# Patient Record
Sex: MALE | Race: WHITE
[De-identification: names, ages, dates, MRNs, and addresses within clinical notes are randomized per-mention and may not be internally consistent; named-entity substitution may affect disease eponyms.]

---

## 2017-05-22 ENCOUNTER — HOSPITAL ENCOUNTER (OUTPATIENT)
Dept: HOSPITAL 47 - OR | Age: 57
Discharge: HOME | End: 2017-05-22
Attending: SURGERY
Payer: COMMERCIAL

## 2017-05-22 VITALS — RESPIRATION RATE: 16 BRPM

## 2017-05-22 VITALS — SYSTOLIC BLOOD PRESSURE: 145 MMHG | HEART RATE: 47 BPM | DIASTOLIC BLOOD PRESSURE: 87 MMHG

## 2017-05-22 VITALS — TEMPERATURE: 96.8 F

## 2017-05-22 VITALS — BODY MASS INDEX: 26.3 KG/M2

## 2017-05-22 DIAGNOSIS — Z87.891: ICD-10-CM

## 2017-05-22 DIAGNOSIS — K40.90: Primary | ICD-10-CM

## 2017-05-22 PROCEDURE — 49520 REREPAIR ING HERNIA REDUCE: CPT

## 2017-05-22 RX ADMIN — HYDROMORPHONE HYDROCHLORIDE PRN MG: 1 INJECTION, SOLUTION INTRAMUSCULAR; INTRAVENOUS; SUBCUTANEOUS at 09:18

## 2017-05-22 RX ADMIN — HYDROMORPHONE HYDROCHLORIDE PRN MG: 1 INJECTION, SOLUTION INTRAMUSCULAR; INTRAVENOUS; SUBCUTANEOUS at 09:13

## 2017-05-22 NOTE — P.GSHP
History of Present Illness


H&P Date: 05/22/17


Chief Complaint: Recurrent right inguinal hernia





This is a 57-year-old male who has developed a recurrent right internal hernia.

  Patient resents today for open repair of right inguinal hernia.





Past Medical History


Past Medical History: Hyperlipidemia, Hypertension


Additional Past Medical History / Comment(s): PAST HX HYPOGLYCEMIA, NONE SINCE 

2001.  GOUT.  CURRENT RT INGUINAL HERNIA.


History of Any Multi-Drug Resistant Organisms: None Reported


Past Surgical History: Appendectomy, Joint Replacement


Additional Past Surgical History / Comment(s): COLONOSCOPY.  TOTAL LT KNEE , 

ROBOTIC HERNIA


Past Anesthesia/Blood Transfusion Reactions: No Reported Reaction


Additional Past Anesthesia/Blood Transfusion Reaction / Comment(s): ADOPTED -NO 

HISTORY


Past Psychological History: No Psychological Hx Reported


Smoking Status: Former smoker


Past Alcohol Use History: Occasional


Additional Past Alcohol Use History / Comment(s): STARTED SMOKING AT AGE 15 

QUIT IN 2000 SMOKED 3/4-1PPD


Past Drug Use History: None Reported





- Past Family History


  ** Mother


Family Medical History: Unable to Obtain





Medications and Allergies


 Home Medications











 Medication  Instructions  Recorded  Confirmed  Type


 


No Known Home Medications [No  05/17/17 05/22/17 History





Known Home Medications]    











 Allergies











Allergy/AdvReac Type Severity Reaction Status Date / Time


 


No Known Allergies Allergy   Verified 05/22/17 06:12














Surgical - Exam


 Vital Signs











Temp Pulse Resp BP Pulse Ox


 


 97.0 F L  51 L  16   142/89   97 


 


 05/22/17 06:17  05/22/17 06:17  05/22/17 06:17  05/22/17 06:17  05/22/17 06:17














- General


well developed, well nourished, no distress





- Eyes


PERRL





- ENT


normal pinna, normal nares





- Neck


no masses





- Respiratory


normal expansion





- Cardiovascular


Rhythm: regular





- Abdomen


Abdomen: soft, non tender


Hernia: inguinal (Right recurrent inguinal hernia)





Assessment and Plan


Plan: 





Recurrent right inguinal hernia.  We'll perform open repair.

## 2017-05-22 NOTE — P.OP
Date of Procedure: 05/22/17


Preoperative Diagnosis: 


Recurrent right inguinal hernia


Postoperative Diagnosis: 


Recurrent right inguinal hernia


Procedure(s) Performed: 


Right inguinal hernia repair


Implants: 





Anesthesia: JENNIFFERA


Surgeon: Regulo Duarte


Estimated Blood Loss (ml): 5


Pathology: none sent


Condition: stable


Disposition: PACU


Indications for Procedure: 





Operative Findings: 





Description of Procedure: 


DESCRIPTION OF PROCEDURE:   The patient was placed in the supine position after 

receiving adequate anesthesia.    Patients groin was prepped and draped in the 

usual sterile fashion.    A standard hernia incision was made and the 

subcutaneous tissues were divided with electrocautery.   The fascia of the 

external oblique was exposed.  A nick the fascia was made with #15 blade.   The 

fascia was then opened with pair of Metzenbaum scissors.    A Weitlaner 

retractor was placed in the wound and the cord structures were grasped and 

dissected free from the inguinal canal.   A rubber Penrose drain was placed 

around the cord structures.   The hernial sac was seen on the anterior-medial 

portion of the cord and this was dissected free from the cord.    The hernia 

sac was then invaginated to the peritoneal cavity.   Using blunt finger 

dissection, the preperitoneal space was dissected and then the Prolene hernial 

mesh plug was placed into the prepared space.   The inferior leaf was expanded.

  The superior leaf was secured to the pubic tubercle using 2-0 Prolene suture.

  The lateral portion of the superior leaf was incised and cords tied and 

secured to the transversalis fascia using 2-0 Prolene suture.   Fascia of the 

external oblique was then closed using #0 Vicryl suture.  The Penrose drain was 

removed.   The Scarpas fascia was then closed with 3-0 Vicryl suture and skin 

was closed with 3-0 Monocryl.  Patient tolerated procedure well.

## 2021-04-14 ENCOUNTER — HOSPITAL ENCOUNTER (INPATIENT)
Dept: HOSPITAL 47 - EC | Age: 61
LOS: 3 days | Discharge: HOME | DRG: 39 | End: 2021-04-17
Attending: INTERNAL MEDICINE | Admitting: INTERNAL MEDICINE
Payer: COMMERCIAL

## 2021-04-14 DIAGNOSIS — M10.9: ICD-10-CM

## 2021-04-14 DIAGNOSIS — Z96.652: ICD-10-CM

## 2021-04-14 DIAGNOSIS — Z87.891: ICD-10-CM

## 2021-04-14 DIAGNOSIS — E78.5: ICD-10-CM

## 2021-04-14 DIAGNOSIS — Z83.3: ICD-10-CM

## 2021-04-14 DIAGNOSIS — I65.22: Primary | ICD-10-CM

## 2021-04-14 DIAGNOSIS — Z20.822: ICD-10-CM

## 2021-04-14 DIAGNOSIS — Z91.14: ICD-10-CM

## 2021-04-14 DIAGNOSIS — H54.62: ICD-10-CM

## 2021-04-14 DIAGNOSIS — R00.1: ICD-10-CM

## 2021-04-14 DIAGNOSIS — Z90.49: ICD-10-CM

## 2021-04-14 DIAGNOSIS — Z87.19: ICD-10-CM

## 2021-04-14 DIAGNOSIS — E78.00: ICD-10-CM

## 2021-04-14 DIAGNOSIS — I10: ICD-10-CM

## 2021-04-14 LAB
ALBUMIN SERPL-MCNC: 4.2 G/DL (ref 3.5–5)
ALP SERPL-CCNC: 37 U/L (ref 38–126)
ALT SERPL-CCNC: 20 U/L (ref 4–49)
ANION GAP SERPL CALC-SCNC: 6 MMOL/L
APTT BLD: 25.1 SEC (ref 22–30)
AST SERPL-CCNC: 25 U/L (ref 17–59)
BASOPHILS # BLD AUTO: 0 K/UL (ref 0–0.2)
BASOPHILS NFR BLD AUTO: 0 %
BUN SERPL-SCNC: 18 MG/DL (ref 9–20)
CALCIUM SPEC-MCNC: 9.6 MG/DL (ref 8.4–10.2)
CHLORIDE SERPL-SCNC: 107 MMOL/L (ref 98–107)
CO2 SERPL-SCNC: 27 MMOL/L (ref 22–30)
EOSINOPHIL # BLD AUTO: 0.3 K/UL (ref 0–0.7)
EOSINOPHIL NFR BLD AUTO: 5 %
ERYTHROCYTE [DISTWIDTH] IN BLOOD BY AUTOMATED COUNT: 4.91 M/UL (ref 4.3–5.9)
ERYTHROCYTE [DISTWIDTH] IN BLOOD: 13.2 % (ref 11.5–15.5)
GLUCOSE SERPL-MCNC: 95 MG/DL (ref 74–99)
HCT VFR BLD AUTO: 44.1 % (ref 39–53)
HGB BLD-MCNC: 15.2 GM/DL (ref 13–17.5)
INR PPP: 1 (ref ?–1.2)
LYMPHOCYTES # SPEC AUTO: 1.4 K/UL (ref 1–4.8)
LYMPHOCYTES NFR SPEC AUTO: 24 %
MCH RBC QN AUTO: 31.1 PG (ref 25–35)
MCHC RBC AUTO-ENTMCNC: 34.5 G/DL (ref 31–37)
MCV RBC AUTO: 89.9 FL (ref 80–100)
MONOCYTES # BLD AUTO: 0.3 K/UL (ref 0–1)
MONOCYTES NFR BLD AUTO: 5 %
NEUTROPHILS # BLD AUTO: 3.9 K/UL (ref 1.3–7.7)
NEUTROPHILS NFR BLD AUTO: 64 %
PLATELET # BLD AUTO: 206 K/UL (ref 150–450)
POTASSIUM SERPL-SCNC: 4.5 MMOL/L (ref 3.5–5.1)
PROT SERPL-MCNC: 7.1 G/DL (ref 6.3–8.2)
PT BLD: 10.4 SEC (ref 9–12)
SODIUM SERPL-SCNC: 140 MMOL/L (ref 137–145)
WBC # BLD AUTO: 6 K/UL (ref 3.8–10.6)

## 2021-04-14 PROCEDURE — 93306 TTE W/DOPPLER COMPLETE: CPT

## 2021-04-14 PROCEDURE — 80061 LIPID PANEL: CPT

## 2021-04-14 PROCEDURE — 99285 EMERGENCY DEPT VISIT HI MDM: CPT

## 2021-04-14 PROCEDURE — 85610 PROTHROMBIN TIME: CPT

## 2021-04-14 PROCEDURE — 86901 BLOOD TYPING SEROLOGIC RH(D): CPT

## 2021-04-14 PROCEDURE — 70450 CT HEAD/BRAIN W/O DYE: CPT

## 2021-04-14 PROCEDURE — 88304 TISSUE EXAM BY PATHOLOGIST: CPT

## 2021-04-14 PROCEDURE — 86850 RBC ANTIBODY SCREEN: CPT

## 2021-04-14 PROCEDURE — 93880 EXTRACRANIAL BILAT STUDY: CPT

## 2021-04-14 PROCEDURE — 88311 DECALCIFY TISSUE: CPT

## 2021-04-14 PROCEDURE — 36415 COLL VENOUS BLD VENIPUNCTURE: CPT

## 2021-04-14 PROCEDURE — 88305 TISSUE EXAM BY PATHOLOGIST: CPT

## 2021-04-14 PROCEDURE — 93005 ELECTROCARDIOGRAM TRACING: CPT

## 2021-04-14 PROCEDURE — 85025 COMPLETE CBC W/AUTO DIFF WBC: CPT

## 2021-04-14 PROCEDURE — 85652 RBC SED RATE AUTOMATED: CPT

## 2021-04-14 PROCEDURE — 84484 ASSAY OF TROPONIN QUANT: CPT

## 2021-04-14 PROCEDURE — 83036 HEMOGLOBIN GLYCOSYLATED A1C: CPT

## 2021-04-14 PROCEDURE — 70496 CT ANGIOGRAPHY HEAD: CPT

## 2021-04-14 PROCEDURE — 71046 X-RAY EXAM CHEST 2 VIEWS: CPT

## 2021-04-14 PROCEDURE — 80053 COMPREHEN METABOLIC PANEL: CPT

## 2021-04-14 PROCEDURE — 70498 CT ANGIOGRAPHY NECK: CPT

## 2021-04-14 PROCEDURE — 86140 C-REACTIVE PROTEIN: CPT

## 2021-04-14 PROCEDURE — 82607 VITAMIN B-12: CPT

## 2021-04-14 PROCEDURE — 85730 THROMBOPLASTIN TIME PARTIAL: CPT

## 2021-04-14 PROCEDURE — 80048 BASIC METABOLIC PNL TOTAL CA: CPT

## 2021-04-14 PROCEDURE — 70551 MRI BRAIN STEM W/O DYE: CPT

## 2021-04-14 PROCEDURE — 86900 BLOOD TYPING SEROLOGIC ABO: CPT

## 2021-04-14 PROCEDURE — 87635 SARS-COV-2 COVID-19 AMP PRB: CPT

## 2021-04-14 PROCEDURE — 84443 ASSAY THYROID STIM HORMONE: CPT

## 2021-04-14 RX ADMIN — CEFAZOLIN SCH MLS/HR: 330 INJECTION, POWDER, FOR SOLUTION INTRAMUSCULAR; INTRAVENOUS at 23:30

## 2021-04-14 RX ADMIN — CEFAZOLIN SCH MLS/HR: 330 INJECTION, POWDER, FOR SOLUTION INTRAMUSCULAR; INTRAVENOUS at 13:34

## 2021-04-14 NOTE — CT
EXAMINATION TYPE: CT angio head neck

 

DATE OF EXAM: 4/14/2021

 

HISTORY: Left vision loss

 

COMPARISON: Carotid ultrasound earlier today.

 

CT DLP: 600.1 mGycm.  Automated Exposure Control for Dose Reduction was Utilized.

 

TECHNIQUE:  CTA scan of the head and neck are performed with IV Contrast, patient injected with 65 mL
 of Isovue 370, axial images are obtained, coronal and sagittal reformatted images are reviewed. Thre
e-D reconstructed images are created on an independent workstation and reviewed.

 

FINDINGS:

 

Carotid/Vascular Structures: Mild peripheral plaque at level aortic arch. Normal three-vessel origin 
from aortic arch. Normal origin right common carotid artery from right brachiocephalic artery. No sig
nificant plaque or stenosis in common carotid arteries bilaterally. There is mild mixed plaque right 
carotid bulb with more prominent noncalcified plaque extending into the external carotid artery. Ther
e is additional abnormal soft tissue probable noncalcified plaque surrounding the proximal right inte
rnal carotid artery without significant stenosis. Some narrowing is present measuring under 50%. Mild
 to moderate calcified plaque distal supraclinoid segment.

 

More moderate to severe mixed plaque left carotid bulb extends into internal and external carotid art
eries. There is significant stenosis on the left confirmed. There is short segment of nonvisualized f
low consistent with complete occlusion raw data image 405 for reference. Remainder left internal cunningham
tid artery shows diminished flow versus opposite right side. Patent bilateral external carotid arteri
es without greater than 50% stenosis.

 

Codominant vertebrobasilar system. Vertebral arteries are patent to basilar junction. Hypoplastic evelyne
ateral posterior communicating arteries. No additional significant focal stenosis. Anterior circulati
on shows poor visualization of anterior communicating artery. No significant focal stenosis or aneury
sm.

 

Other: Loss of normal cervical curvature with mild disc space narrowing C5-C6 and C6-C7 levels. S-sha
ped scoliotic curvature on coronal images.

 

IMPRESSION: Confirmation of significant stenosis proximal left internal carotid artery, complete occl
usion over short segment is felt present. There is poor or diminished flow in the left internal carot
id artery distal to this versus the opposite right side.

## 2021-04-14 NOTE — P.HPIM
History of Present Illness


H&P Date: 04/14/21


Chief Complaint: Left eye blurred vision





Patient is a 61-year-old male with a known history of hypertension, 

hyperlipidemia currently not on any medications and occasional marijuana use 

presents to ER with complaints of blurry vision in the left eye.  Patient felt 

like everything was gray when he closes his right eye.  Patient was able to see 

well by closing left eye.  Patient had this episode occurred about 2 days ago 

and lasted about 3 to 5 minutes.  Since then patient has been having some blurry

vision.  Denied any fever or chills.  No cough or sputum production.  No recent 

infection.  No chest pain or shortness breath.  No palpitations.  No leg 

swelling.  No focal weakness or slurred speech.  No complaints of double vision.

 No jaw pain or temporal pain jaw claudication.


Patient states that he had [employer] COVID-19 vaccine about a month ago and started 

having occasional headaches few days after.


CT head showed no acute intracranial process


Chest x-ray showed no acute cardiopulmonary process


EKG showed marked sinus bradycardia.  Abnormal EKG.  Heart rate for 42


Laboratory data showed WBC 6.0 hemoglobin 15.2 and platelets 206


BUN 18 and creatinine 0.85 AST 25 ALT 20 alk phos 37 troponin less than 0.012 

and coronavirus PCR not detected.





Review of Systems





Constitutional: Patient denies any fever or chills .  No generalized weakness or

weight loss.  


Abdomen: Patient denied nausea vomiting and diarrhea and abdominal pain.


Cardiovascular: Patient denies any chest pain or short of breath no 

palpitations.


Respiratory: patient denied any cough or sputum production.  No shortness of 

breath


Neurologic: Patient denied any numbness or tingling headache. Left eye blurred 

vision


Musculoskeletal: Patient denies any complaints of joint swelling or deformity.


Skin: Negative


Psychiatric: Negative


Endocrine: No heat or cold intolerance.  No recent weight gain.


Genitourinary: No dysuria or hematuria.


All other 14 point ROS negative except the above





Past Medical History


Past Medical History: Hyperlipidemia, Hypertension


Additional Past Medical History / Comment(s): PAST HX HYPOGLYCEMIA, NONE SINCE 

2001.  GOUT.  CURRENT RT INGUINAL HERNIA.


History of Any Multi-Drug Resistant Organisms: None Reported


Past Surgical History: Appendectomy, Joint Replacement


Additional Past Surgical History / Comment(s): COLONOSCOPY.  TOTAL LT KNEE , 

ROBOTIC HERNIA


Past Anesthesia/Blood Transfusion Reactions: No Reported Reaction


Additional Past Anesthesia/Blood Transfusion Reaction / Comment(s): ADOPTED -NO 

HISTORY


Past Psychological History: No Psychological Hx Reported


Smoking Status: Never smoker


Past Alcohol Use History: Occasional


Past Drug Use History: Marijuana





- Past Family History


  ** Mother


Family Medical History: Unable to Obtain


Additional Family Medical History / Comment(s): Pt is adopted and only knows 

heart disease ran on natural mother's side of family





  ** Father


Family Medical History: Diabetes Mellitus


Additional Family Medical History / Comment(s): Pt is adopted and only knows 

diabetes ran on natural father's side of family.





Medications and Allergies


                                Home Medications











 Medication  Instructions  Recorded  Confirmed  Type


 


No Known Home Medications  04/14/21 04/14/21 History








                                    Allergies











Allergy/AdvReac Type Severity Reaction Status Date / Time


 


No Known Allergies Allergy   Verified 04/14/21 12:53














Physical Exam


Vitals: 


                                   Vital Signs











  Temp Pulse Resp BP Pulse Ox


 


 04/14/21 13:41   45 L  16  130/98  99


 


 04/14/21 12:32   46 L  16  128/80  99


 


 04/14/21 10:55  98.3 F  52 L  18  136/83  93 L








                                Intake and Output











 04/13/21 04/14/21 04/14/21





 22:59 06:59 14:59


 


Other:   


 


  Weight   92.986 kg














PHYSICAL EXAMINATION: 


Patient is lying in the bed comfortably, no acute distress, awake alert and 

oriented.. 


HEENT: Normocephalic. Neck is supple. Pupils reactive. Nostrils clear. Oral 

cavity is moist. Ears reveal no drainage. 


Neck reveals no JVD, carotid bruits, or thyromegaly. 


CHEST EXAMINATION: Trachea is central. Symmetrical expansion. Lung fields clear 

to auscultation and percussion. 


CARDIAC: Normal S1, S2 with no gallops. No murmurs 


ABDOMEN: Soft. Bowel sounds normal. No organomegaly. No abdominal bruits. 


Extremities: reveal no edema.  No clubbing or cyanosis


Neurologically awake, alert, oriented x3 with well-coordinated movements.  No 

focal deficits noted


Skin: No rash or skin lesions. 


Psychiatric: Coperative.  Nonsuicidal


Musculoskeletal: No joint swelling or deformity.  Normal range of motion.





Results


CBC & Chem 7: 


                                 04/14/21 11:36





                                 04/14/21 11:36


Labs: 


                  Abnormal Lab Results - Last 24 Hours (Table)











  04/14/21 Range/Units





  11:36 


 


Alkaline Phosphatase  37 L  ()  U/L














Thrombosis Risk Factor Assmnt





- DVT/VTE Prophylaxis


DVT/VTE Prophylaxis: Pharmacologic Prophylaxis ordered





- Choose All That Apply


Any of the Below Risk Factors Present?: Yes


Other Risk Factors: Yes


Each Risk Factor Represents 2 Points: Age 61-74 years


Other congenital or acquired thrombophilia - If yes, enter type in comment: No


Each Risk Factor Represents 5 Points: Stroke (< 1 month)


Thrombosis Risk Factor Assessment Total Risk Factor Score: 7


Thrombosis Risk Factor Assessment Level: High Risk





Assessment and Plan


Assessment: 





Transient left eye vision loss.  Rule out acute CVA


Sinus bradycardia


Hypertension not on any medications


Hyperlipidemia not on any medications at home


History of marijuana use


DVT prophylaxis





Plan:


Patient will be continued telemetry monitoring.  Started on aspirin and Plavix 

as per neurology recommendations.  Patient was given a dose of aspirin 325 mg 

once in the ER.  Loading dose of Plavix was given.


Stroke work-up including MRI of the brain and CT angiogram of the head and neck 

was ordered.  2D echocardiogram


Follow-up TSH A1c level and B12 and CRP levels.  Neurology is on board.  Further

recommendations based on the clinical course.


Time with Patient: Greater than 30

## 2021-04-14 NOTE — XR
EXAMINATION TYPE: XR chest 2V

 

DATE OF EXAM: 4/14/2021

 

COMPARISON: NONE

 

HISTORY:  Altered mental status, vision loss, weakness.  

 

TECHNIQUE:  Frontal and lateral views of the chest are obtained.

 

FINDINGS:  There is mild chronic parenchymal changes without suspicious focal air space opacity, pleu
ral effusion, or pneumothorax seen.  The cardiac silhouette size is within normal limits.   The osseo
us structures are intact. Overlying EKG leads are present.

 

IMPRESSION:  No acute cardiopulmonary process.

## 2021-04-14 NOTE — CT
EXAMINATION TYPE: CT brain wo con for TPA

 

DATE OF EXAM: 4/14/2021

 

HISTORY: loss of vision Lt eye 2 days ago. Acute onset neurological deficit. Code stroke.

 

CT DLP: 1059.4 mGycm.  Automated Exposure Control for Dose Reduction was Utilized.

 

TECHNIQUE: CT scan of the head is performed without contrast.

 

COMPARISON: None.

 

FINDINGS:   There is no acute intracranial hemorrhage or midline shift identified. Ventricles and sul
ci are within normal limits in size for patient's age. Gray-white matter differentiation fairly well 
maintained. The globes are intact and the visualized sinuses are clear.   

 

IMPRESSION:  No acute intracranial hemorrhage or midline shift.

## 2021-04-14 NOTE — P.CNNES
History of Present Illness


Consult date: 04/14/21


Requesting physician: Miah Hernandez


Reason for Consult: vision loss left eye concern for transient ischemic attack


History of Present Illness: 


This is a 61-year-old gentleman with history of hyperlipidemia, hypertension 

that presented to the emergency department on 04/14/2021 for vision loss over 

the left eye.  Per the patient and the last 1 week he noticed some vision change

and felt off tired but could not make out of the vision change and which I was. 

Then about 2 days ago in the afternoon all of a sudden he noticed that the left 

eye all of a sudden became gray and he could not see out of it and the episode 

lasted for about 3-10 minutes.  He felt weak during the episode and tired and it

was a diffuse.  Denied any focal weakness, numbness, difficulty getting his 

words out.  Denies of any double vision.  Denies any jaw pain or pain with 

chewing.  His symptoms resolved and he has not had any further episode.  He said

that for the last couple weeks possibly 2-3 weeks he's been having left-sided 

headache and he felt that was a dull that was intermittent, stated it was 8/10 

but cannot tell me how long it lasted for.  Denied any photophobia, phonophobia,

nausea or vomiting.  He's been having headaches since the getting his covid 

vaccine about a month ago (Deniz and Deniz).


He did state that he was in the past on blood pressure medication for his 

hypertension as well as the cholesterol medication but has not been on it for 

years since he was having the side effects such as cramping.  He said that when 

he is to follow up with his primary care he was told that his blood pressure was

normal but the last time he followed up with them was possibly 2-1/2 years ago. 

She denies any history of migraines in the past.  As stated above he feels back 

to his baseline.  He denies of any stroke or transient ischemic attack





Initial vital signs: Blood pressure of 136/83, heart rate of 52, respiratory of 

18, temperature of 98.3 Fahrenheit oral and the pulse ox of 93 at room air.


CT of the head is reported as no acute intracranial hemorrhage or midline shift.


EKG is reported as marked sinus bradycardia.  Abnormal EKG.


The basic CBC and then BMP were reviewed and were normal.  The glucose was 95 

and that was a serum which is considered normal.


Coagulation study is PT of 10.4, INR is 1.0 and PTT of 25.1.








Review of Systems


Review of system:  The 12 point system was reviewed and apparent positive and 

negative per HPI.








Past Medical History


Past Medical History: Hyperlipidemia, Hypertension


Additional Past Medical History / Comment(s): PAST HX HYPOGLYCEMIA, NONE SINCE 

2001.  GOUT.  CURRENT RT INGUINAL HERNIA.


History of Any Multi-Drug Resistant Organisms: None Reported


Past Surgical History: Appendectomy, Joint Replacement


Additional Past Surgical History / Comment(s): COLONOSCOPY.  TOTAL LT KNEE , 

ROBOTIC HERNIA


Past Anesthesia/Blood Transfusion Reactions: No Reported Reaction


Additional Past Anesthesia/Blood Transfusion Reaction / Comment(s): ADOPTED -NO 

HISTORY


Past Psychological History: No Psychological Hx Reported


Smoking Status: Never smoker


Past Alcohol Use History: Occasional


Past Drug Use History: Marijuana





- Past Family History


  ** Mother


Family Medical History: Unable to Obtain





  ** Father


Family Medical History: Diabetes Mellitus


Additional Family Medical History / Comment(s): Pt is adopted and only knows 

diabetes ran on natural father's side of family.





Medications and Allergies


                                Home Medications











 Medication  Instructions  Recorded  Confirmed  Type


 


No Known Home Medications  04/14/21 04/14/21 History








                                    Allergies











Allergy/AdvReac Type Severity Reaction Status Date / Time


 


No Known Allergies Allergy   Verified 04/14/21 12:53














Physical Examination





- Vital Signs


Vital Signs: 


                                   Vital Signs











  Temp Pulse Resp BP Pulse Ox


 


 04/14/21 12:32   46 L  16  128/80  99


 


 04/14/21 10:55  98.3 F  52 L  18  136/83  93 L








                                Intake and Output











 04/13/21 04/14/21 04/14/21





 22:59 06:59 14:59


 


Other:   


 


  Weight   92.986 kg











GENERAL: The patient is lying in bed and is not in acute distress.


CHEST: The heart rate is regular rate rhythm.   No murmurs to auscultation.  No 

carotid bruit bilaterally.


LUNG: Clear to auscultation bilaterally no wheezing noted throughout.  Not 

labored breathing.


ABDOMEN/GI: Bowel sounds present in all 4 quadrants. No tenderness to palpation 

throughout.





NEUROLOGICAL:


Higher mental function: The patient is awake, alert, oriented to self, place and

time.  Patient is following commands.  No aphasia and no neglect.


Cranial nerves: The pupils are round, right is 5mm and left 4mm and reactive to 

light and accommodation.  Visual fields are full to confrontation throughout. 

Visual acuity is 20/25 OD and 20/30 OS without correction.   Extraocular 

movement is intact no nystagmus is noted.  Facial sensation is normal to touch 

throughout.  The facial strength is normal throughout.  Hearing is normal 

bilaterally to hand rub.  Tongue is midline and moved side-to-side without any 

difficulty.  No dysarthria is noted.  Shoulder shrug is normal bilaterally.


Motor: Gait is deferred.  The strength is 5 over 5 throughout.  Normal tone and 

bulk.  


Cerebellum: Normal finger to nose heel to chin bilaterally.


Sensation: Sensation is normal to touch throughout.


Reflexes (right/left):0-1+ over the left knee (has prosthetic knee) .  Otherwise

2+ throughout.


Plantars are downgoing bilaterally. 








Results





- Laboratory Findings


CBC and BMP: 


                                 04/14/21 11:36





                                 04/14/21 11:36


Abnormal Lab Findings: 


                                  Abnormal Labs











  04/14/21





  11:36


 


Alkaline Phosphatase  37 L














Assessment and Plan


Assessment: 





Amarousis fugax (transient episode of left vision loss).  Possibly due to patie

nt risk factor of HTN and hypercholestremia (not taking medication since had 

lorraine-effect to them). 


Hypertension (not compliant with medication)


Hyperlipidemia (not compliant with medication)





Plan: 


The patient was given aspirin 325 once was started on aspirin 325 daily.  I 

started the patient on aspirin 81 mg and Plavix 75 mg daily for now.  I loaded 

with Plavix 300mg once and Lipitor 80 mg once then to be on the Lipitor 40 mg 

daily for secondary stroke prophylaxis (will not start Lipitor 80mg daily since 

in past has side-effects with statin but does not recall name or dose).


I ordered MRI of the brain.


I ordered CT of the head and neck to rule out any intracranial or carotid 

stenosis


Lipid panel, 2-D echo and carotid duplex are ordered by the ED team.


Ordered TSH, ESR and CRP level.


On continuous cardiac monitoring


Q 4hour neuro checks.


PT, OT and SLP are consulted.





Ophthalmology team is consulted.





The plan is discussed with the patient and his nurse.





Thank you for the consultation.





Monroe Mejias M.D.


Neuro-hospitalist





Time with Patient: Greater than 30

## 2021-04-14 NOTE — US
EXAMINATION TYPE: US carotid duplex BILAT

 

DATE OF EXAM: 4/14/2021

 

COMPARISON: NONE

 

CLINICAL HISTORY: Stenosis. Pt states transient vision loss in left eye

 

EXAM MEASUREMENTS: 

 

RIGHT:  Peak Systolic Velocity (PSV) cm/sec

----- Right CCA:  76.8  

----- Right ICA:  96.6     

----- Right ECA:  116.1   

ICA/CCA ratio:  1.3    

 

RIGHT:  End Diastole cm/sec

----- Right CCA:  21.9   

----- Right ICA:  30.7      

----- Right ECA:  11.1     

 

LEFT:  Peak Systolic Velocity (PSV) cm/sec

----- Left CCA:  49.0  

----- Left ICA:  392.8   

----- Left ECA:  133.8  

ICA/CCA ratio:  8.0  

 

LEFT:  End Diastole cm/sec

----- Left CCA:  7.8  

----- Left ICA:  123.7   

----- Left ECA:  17.5 

 

VERTEBRALS (direction of flow):

Right Vertebral: Antegrade

Left Vertebral: Antegrade

 

Rhythm:  Normal

 

**Significant, high-grade stenosis within left ICA** intimal thickening is present. There are focal p
laques present.

 

 

IMPRESSION:  

1. Atheromatous plaquing activity into significant flow-limiting stenosis greater than 70% within the
 left internal carotid artery.

   

 

 

Criteria for Assigning % of Stenosis / Diameter reduction

(Estimation based on the indirect measurements of the internal carotid artery velocities (ICA PSV).

1.  Normal (no stenosis)=ICA PSV < 125 cm/s: ratio < 2.0: ICA EDV<40 cm/s.

2. Less than 50% stenosis=ICA PSV < 125 cm/s: ratio < 2.0: ICA EDV<40 cm/s.

3.  50 to 69% stenosis=ICA PSV of 125 to 230 cm/s: ration 2.0 ? 4.0: ICA EDV  cm/s.

4.  Greater than 70% stenosis to near occlusion= ICA PSV > 230 cm/s: ratio > 4.0: ICA EDV > 100 cm/s.
 

5.  Near occlusion= ICA PSV velocities may be low or undetectable: variable ratio and ICA EDV.

6.  Total occlusion=unable to detect flow.

## 2021-04-14 NOTE — ED
General Adult HPI





- General


Chief complaint: Neuro Symptoms/Deficit


Stated complaint: vision loss/2x last week


Time Seen by Provider: 04/14/21 10:59


Source: patient, family, RN notes reviewed


Mode of arrival: ambulatory


Limitations: no limitations





- History of Present Illness


Initial comments: 





Patient is a pleasant 61-year-old male presenting to the emergency department 

with concern for loss of vision with left eye.  Incident last occurred 2 days 

ago and lasted 3-5 minutes.  Patient states everything was gray.  Patient could 

not see specific silhouettes.  Patient unclear if he can see light or dark.  

This was painless.  Patient did have a similar episode a few days prior to that 

however did not last as long.  Vision is completely normal at this time.  No 

speech Problems.  No extremity weakness.  No confusion.





- Related Data


                                  Previous Rx's











 Medication  Instructions  Recorded


 


Docusate [Colace] 100 mg PO BID #20 capsule 05/22/17


 


HYDROcodone/APAP 7.5-325MG [Norco 1 each PO Q4H PRN #60 tab 05/22/17





7.5]  











                                    Allergies











Allergy/AdvReac Type Severity Reaction Status Date / Time


 


No Known Allergies Allergy   Verified 04/14/21 10:58














Review of Systems


ROS Statement: 


Those systems with pertinent positive or pertinent negative responses have been 

documented in the HPI.





ROS Other: All systems not noted in ROS Statement are negative.


Constitutional: Denies: fever


Eyes: Reports: as per HPI, vision change


ENT: Denies: ear pain


Respiratory: Denies: cough


Cardiovascular: Denies: chest pain


Endocrine: Denies: fatigue


Gastrointestinal: Denies: abdominal pain


Genitourinary: Denies: dysuria


Musculoskeletal: Denies: back pain


Skin: Denies: rash


Neurological: Denies: headache, weakness, confusion





Past Medical History


Past Medical History: Hyperlipidemia, Hypertension


Additional Past Medical History / Comment(s): PAST HX HYPOGLYCEMIA, NONE SINCE 

2001.  GOUT.  CURRENT RT INGUINAL HERNIA.


History of Any Multi-Drug Resistant Organisms: None Reported


Past Surgical History: Appendectomy, Joint Replacement


Additional Past Surgical History / Comment(s): COLONOSCOPY.  TOTAL LT KNEE , 

ROBOTIC HERNIA


Past Anesthesia/Blood Transfusion Reactions: No Reported Reaction


Additional Past Anesthesia/Blood Transfusion Reaction / Comment(s): ADOPTED -NO 

HISTORY


Past Psychological History: No Psychological Hx Reported


Smoking Status: Never smoker


Past Alcohol Use History: Occasional


Past Drug Use History: Marijuana





- Past Family History


  ** Mother


Family Medical History: Unable to Obtain





General Exam


Limitations: no limitations


General appearance: alert, in no apparent distress


Head exam: Present: atraumatic


Eye exam: Present: normal appearance, PERRL, EOMI


  ** Expanded


Eyelids: Normal Inspection: Bilateral


Pupils: Regular, Round: Bilateral, Reactive: Bilateral


Sclera/Conjunctival: Normal Inspection: Bilateral


Posterior chamber: Normal Inspection: Bilateral


ENT exam: Present: normal oropharynx


Neck exam: Present: normal inspection


Respiratory exam: Present: normal lung sounds bilaterally


Cardiovascular Exam: Present: regular rate, normal rhythm


GI/Abdominal exam: Present: soft.  Absent: tenderness


Extremities exam: Present: normal inspection


Neurological exam: Present: alert, oriented X3, CN II-XII intact.  Absent: motor

sensory deficit


  ** Expanded


Neurological exam: Present: protecting the airway


Speech: Present: fluid speech


Cranial nerves: EOM's Intact: Normal, Facial Sensation: Normal


Sensory exam: Upper Extremity Light Touch: Normal, Lower Extremity Light Touch: 

Normal


Motor strength exam: RUE: 5, LUE: 5, RLE: 5, LLE: 5


Eye Response: (4) open spontaneously


Motor Response: (6) obeys commands


Verbal Response: (5) oriented


Psychiatric exam: Present: normal affect, normal mood


Skin exam: Present: normal color





Course


                                   Vital Signs











  04/14/21 04/14/21





  10:55 12:32


 


Temperature 98.3 F 


 


Pulse Rate 52 L 46 L


 


Respiratory 18 16





Rate  


 


Blood Pressure 136/83 128/80


 


O2 Sat by Pulse 93 L 99





Oximetry  














- Reevaluation(s)


Reevaluation #1: 





04/14/21 11:57


Case was discussed with Dr. Porras with ophthalmology who states he would be 

happy to see patient in office however has more concern for neurological cause 

of symptoms.





EKG Findings





- EKG Comments:


EKG Findings:: Sinus bradycardia with a rate of 42.  .  .  . 

QTc 367.  Normal axis.  Normal QRS.  No acute ST change.





Medical Decision Making





- Lab Data


Result diagrams: 


                                 04/14/21 11:36





                                 04/14/21 11:36


                                   Lab Results











  04/14/21 04/14/21 04/14/21 Range/Units





  11:36 11:36 11:36 


 


WBC  6.0    (3.8-10.6)  k/uL


 


RBC  4.91    (4.30-5.90)  m/uL


 


Hgb  15.2    (13.0-17.5)  gm/dL


 


Hct  44.1    (39.0-53.0)  %


 


MCV  89.9    (80.0-100.0)  fL


 


MCH  31.1    (25.0-35.0)  pg


 


MCHC  34.5    (31.0-37.0)  g/dL


 


RDW  13.2    (11.5-15.5)  %


 


Plt Count  206    (150-450)  k/uL


 


MPV  7.4    


 


Neutrophils %  64    %


 


Lymphocytes %  24    %


 


Monocytes %  5    %


 


Eosinophils %  5    %


 


Basophils %  0    %


 


Neutrophils #  3.9    (1.3-7.7)  k/uL


 


Lymphocytes #  1.4    (1.0-4.8)  k/uL


 


Monocytes #  0.3    (0-1.0)  k/uL


 


Eosinophils #  0.3    (0-0.7)  k/uL


 


Basophils #  0.0    (0-0.2)  k/uL


 


PT   10.4   (9.0-12.0)  sec


 


INR   1.0   (<1.2)  


 


APTT   25.1   (22.0-30.0)  sec


 


Sodium    140  (137-145)  mmol/L


 


Potassium    4.5  (3.5-5.1)  mmol/L


 


Chloride    107  ()  mmol/L


 


Carbon Dioxide    27  (22-30)  mmol/L


 


Anion Gap    6  mmol/L


 


BUN    18  (9-20)  mg/dL


 


Creatinine    0.85  (0.66-1.25)  mg/dL


 


Est GFR (CKD-EPI)AfAm    >90  (>60 ml/min/1.73 sqM)  


 


Est GFR (CKD-EPI)NonAf    >90  (>60 ml/min/1.73 sqM)  


 


Glucose    95  (74-99)  mg/dL


 


Calcium    9.6  (8.4-10.2)  mg/dL


 


Total Bilirubin    0.8  (0.2-1.3)  mg/dL


 


AST    25  (17-59)  U/L


 


ALT    20  (4-49)  U/L


 


Alkaline Phosphatase    37 L  ()  U/L


 


Troponin I     (0.000-0.034)  ng/mL


 


Total Protein    7.1  (6.3-8.2)  g/dL


 


Albumin    4.2  (3.5-5.0)  g/dL














  04/14/21 Range/Units





  11:36 


 


WBC   (3.8-10.6)  k/uL


 


RBC   (4.30-5.90)  m/uL


 


Hgb   (13.0-17.5)  gm/dL


 


Hct   (39.0-53.0)  %


 


MCV   (80.0-100.0)  fL


 


MCH   (25.0-35.0)  pg


 


MCHC   (31.0-37.0)  g/dL


 


RDW   (11.5-15.5)  %


 


Plt Count   (150-450)  k/uL


 


MPV   


 


Neutrophils %   %


 


Lymphocytes %   %


 


Monocytes %   %


 


Eosinophils %   %


 


Basophils %   %


 


Neutrophils #   (1.3-7.7)  k/uL


 


Lymphocytes #   (1.0-4.8)  k/uL


 


Monocytes #   (0-1.0)  k/uL


 


Eosinophils #   (0-0.7)  k/uL


 


Basophils #   (0-0.2)  k/uL


 


PT   (9.0-12.0)  sec


 


INR   (<1.2)  


 


APTT   (22.0-30.0)  sec


 


Sodium   (137-145)  mmol/L


 


Potassium   (3.5-5.1)  mmol/L


 


Chloride   ()  mmol/L


 


Carbon Dioxide   (22-30)  mmol/L


 


Anion Gap   mmol/L


 


BUN   (9-20)  mg/dL


 


Creatinine   (0.66-1.25)  mg/dL


 


Est GFR (CKD-EPI)AfAm   (>60 ml/min/1.73 sqM)  


 


Est GFR (CKD-EPI)NonAf   (>60 ml/min/1.73 sqM)  


 


Glucose   (74-99)  mg/dL


 


Calcium   (8.4-10.2)  mg/dL


 


Total Bilirubin   (0.2-1.3)  mg/dL


 


AST   (17-59)  U/L


 


ALT   (4-49)  U/L


 


Alkaline Phosphatase   ()  U/L


 


Troponin I  <0.012  (0.000-0.034)  ng/mL


 


Total Protein   (6.3-8.2)  g/dL


 


Albumin   (3.5-5.0)  g/dL














- Radiology Data


Radiology results: report reviewed (Computed tomography scan of the brain 

negative for acute abnormality), image reviewed ( reveals no acute process)





Disposition


Clinical Impression: 


 Transient cerebral ischemia





Disposition: ADMITTED AS IP TO THIS [facility]


Is patient prescribed a controlled substance at d/c from ED?: No


Referrals: 


None,Stated [Primary Care Provider] - 1-2 days


Decision Time: 12:52

## 2021-04-15 LAB
CHOLEST SERPL-MCNC: 218 MG/DL (ref ?–200)
HBA1C MFR BLD: 5.6 % (ref 4–6)
HDLC SERPL-MCNC: 43 MG/DL (ref 40–60)
LDLC SERPL CALC-MCNC: 161 MG/DL (ref 0–99)
TRIGL SERPL-MCNC: 71 MG/DL (ref ?–150)

## 2021-04-15 PROCEDURE — 03CL0ZZ EXTIRPATION OF MATTER FROM LEFT INTERNAL CAROTID ARTERY, OPEN APPROACH: ICD-10-PCS

## 2021-04-15 PROCEDURE — 03UL0KZ SUPPLEMENT LEFT INTERNAL CAROTID ARTERY WITH NONAUTOLOGOUS TISSUE SUBSTITUTE, OPEN APPROACH: ICD-10-PCS

## 2021-04-15 RX ADMIN — CEFAZOLIN SCH: 330 INJECTION, POWDER, FOR SOLUTION INTRAMUSCULAR; INTRAVENOUS at 10:25

## 2021-04-15 RX ADMIN — HEPARIN SODIUM SCH UNIT: 5000 INJECTION INTRAVENOUS; SUBCUTANEOUS at 15:20

## 2021-04-15 RX ADMIN — ATORVASTATIN CALCIUM SCH MG: 40 TABLET, FILM COATED ORAL at 20:57

## 2021-04-15 RX ADMIN — ASPIRIN 81 MG CHEWABLE TABLET SCH MG: 81 TABLET CHEWABLE at 07:59

## 2021-04-15 RX ADMIN — CEFAZOLIN SCH MLS/HR: 330 INJECTION, POWDER, FOR SOLUTION INTRAMUSCULAR; INTRAVENOUS at 07:59

## 2021-04-15 NOTE — P.PN
Subjective


Progress Note Date: 04/15/21


The patient was seen at bedside and he denies any further blurry vision over the

left eye.  He states that he is back to baseline since been in the hospital.  He

denies of any new weakness, numbness, visual disturbance, difficulty getting his

words out swallowing.








Objective





- Vital Signs


Vital signs: 


                                   Vital Signs











Temp  96.2 F L  04/15/21 07:00


 


Pulse  49 L  04/15/21 07:00


 


Resp  16   04/15/21 07:00


 


BP  168/95   04/15/21 07:00


 


Pulse Ox  96   04/15/21 07:00








                                 Intake & Output











 04/14/21 04/15/21 04/15/21





 18:59 06:59 18:59


 


Weight 92.986 kg  


 


Other:   


 


  Voiding Method Toilet Toilet 


 


  # Voids 0 3 


 


  # Bowel Movements 0  














- Exam


GENERAL: The patient is lying in bed and is not in acute distress.





NEUROLOGICAL:


Higher mental function: The patient is awake, alert, oriented to self, place and

time.  Patient is following commands.  No aphasia and no neglect.


Cranial nerves: The pupils are round, right is 5mm and left 4mm and reactive to 

light and accommodation.  Visual fields are full to confrontation throughout. 

Visual acuity is 20/25 OD and 20/30 OS without correction.   Extraocular 

movement is intact no nystagmus is noted.  Facial sensation is normal to touch 

throughout.  The facial strength is normal throughout.  Hearing is normal 

bilaterally to hand rub.  Tongue is midline and moved side-to-side without any 

difficulty.  No dysarthria is noted.  Shoulder shrug is normal bilaterally.


Motor: Gait is deferred.  The strength is 5 over 5 throughout.  Normal tone and 

bulk.  


Cerebellum: Normal finger to nose heel to chin bilaterally.


Sensation: Sensation is normal to touch throughout.


Reflexes (right/left):0-1+ over the left knee (has prosthetic knee) .  Otherwise

2+ throughout.


Plantars are downgoing bilaterally. 








- Labs


CBC & Chem 7: 


                                 04/14/21 11:36





                                 04/14/21 11:36


Labs: 


                  Abnormal Lab Results - Last 24 Hours (Table)











  04/14/21 Range/Units





  11:36 


 


Alkaline Phosphatase  37 L  ()  U/L














Assessment and Plan


Assessment: 





Amarousis fugax (transient episode of left vision loss) due to symptomatic left 

internal carotid artery stenosis (>70%)


Symptomatic left ICA stenosis


Hypertension (not compliant with medication)


Hyperlipidemia (not compliant with medication)





Plan: 








Carotid Duplex: Is reported as after L[place] plaque in activity into significant 

flow limiting stenosis greater than 70% within the left internal carotid artery.


CT angiography of the head and neck is reported as confirmation of significant 

stenosis proximal left internal carotid artery, complete occlusion over the 

short segment is felt present.  There is poor or diminished flow in the left 

internal carotid artery distal to this versus the opposite right side.


2-D echo was reported as mild concentric left ventricular atrophy.  Ejection 

fraction between 60 and 65%.  Normal left atrial size by volume.  Is moderate 

pulmonary hypertension.





Continue Aspirin 81 mg and Plavix 75 mg daily. Continue Lipitor 40 mg daily for 

secondary stroke prophylaxis (will not start Lipitor 80mg daily since in past 

has side-effects with statins but does not recall name or dose).


Pending MRI of the brain.





TSH: 1.95 ESR (10 (normal) and CRP level (<0.4 normal)


Pending lipid panel.


On continuous cardiac monitoring


Q 4hour neuro checks.


PT, OT and SLP are consulted.





I consulted vascular surgery team on 04/14/2021.  From a neurological standpoint

would recommend that the patient to get intervention of the left internal cunningham

tid artery as an inpatient.  I notified the patient the benefits and the risk of

having it done and he wants the to go ahead with intervention.  Pending MRI the 

brain which will happen likely today then I'll have a discussion with a vascular

surgical team.





Ophthalmology team is consulted by ED team.





Recommend to avoid any hypotensive episodes and keep the blood pressure 

permissive.  Treatment of the systolic blood pressure is more than 180 or 

diastolic blood pressure is more than 100.





UPDATE:


MR the brain is reported as no MRI evidence for a recent infarct.  Mild 

nonspecific white matter changes most likely on the basis of part of chronic 

small vessel ischemic change in the position of the patient's age.  I personally

reviewed the MRI and I don't see any acute or subacute ischemic stroke.





I spoke with vascular surgeon nurse practitioner and she stated that the patient

is scheduled tomorrow for carotid endarterectomy.





The plan is discussed with the patient and his nurse.





Monroe Mejias M.D.


Neuro-hospitalist





Time with Patient: Less than 30

## 2021-04-15 NOTE — P.CRDCN
History of Present Illness


Consult date: 04/15/21


History of present illness: 





HISTORY OF PRESENT ILLNESS:  





This is a 61-year-old male with a past medical history significant for 

hypertension and hyperlipidemia. Patient does not follow with a cardiologist. We

have been asked to see the patient in consultation for cardiac clearance for 

left carotid endarterectomy. Patient examined at the bedside by Dr. Urena.  

Patient originally presented to the ER due to loss of vision. Patient was found 

to have left internal carotid  stenosis of greater than 70%.  The patient is 

scheduled for left carotid endarterectomy tomorrow with Dr. Story.  Blood 

pressure 168/95.  Telemetry reveals sinus bradycardia with a heart rate in the 

50s.





EKG reveals sinus bradycardia with no signs of acute ischemia


Chest xray negative for acute process


Carotid Doppler: Stenosis greater than 70% within the left internal carotid 

artery


Laboratory data: WBC 6.0.  Hemoglobin 15.2.  Platelet count 206.  Sodium 140.  

Potassium 4.5.  BUN 18.  Creatinine 0.85.  Troponin negative 1.  Total 

cholesterol 218.  .


Current home cardiac medications include none


Echocardiogram obtained revealed ejection fraction 60-65%, mild mitral 

regurgitation, mild tricuspid regurgitation, and moderate pulmonary hypertension





REVIEW OF SYSTEMS: 


At the time of my exam:


CONSTITUTIONAL: Denies fever or chills.


HEENT: Denies blurred vision, vision changes, or eye pain. Denies hemoptysis 


CARDIOVASCULAR: Denies chest pain. Denies orthopnea. Denies PND. Denies 

palpitations


RESPIRATORY: Denies shortness of breath. 


GASTROINTESTINAL: Denies abdominal pain. Denies nausea or vomiting. 


HEMATOLOGIC: Denies bleeding disorders.


GENITOURINARY:  Denies any blood in urine.


SKIN: Denies pruitis. Denies rash.





PHYSICAL EXAM: 


VITAL SIGNS: Reviewed.


GENERAL: Well-developed in no acute distress. 


HEENT: Head is normocephalic. Pupils are equal, round. Sclerae anicteric. Mucous

membranes of the mouth are moist. Neck supple. No JVD or thyromegaly


LUNGS: Respirations even and unlabored. Lungs essentially clear to auscultation 

bilaterally.


HEART: Bradycardic.  Regular rate and rhythm.  S1 and S2 heard.


ABDOMEN: Soft. Nondistended. Nontender.


EXTREMITIES: Normal range of motion.  No clubbing or cyanosis.  Peripheral 

pulses intact.  No lower extremity edema


NEUROLOGIC: Awake and alert. Oriented x 3. 





ASSESSMENT: 


Amarousis fugax


Left internal carotid artery stenosis


Hypertension


Hyperlipidemia





PLAN: 


Continue aspirin and plavix


Patient has been started on lipitor 40mg. He was unable to tolerate statins in 

the past due to side effects


Continue telemetry monitoring


Monitor blood pressure


Patient is at an acceptable risk to undergo surgery  and there are no absolute 

contraindications from a cardiac standpoint


Further recommendations pending patient's course





Nurse practitioner note has been reviewed by physician. Signing provider agrees 

with the documented findings, assessment, and plan of care. 








Past Medical History


Past Medical History: Hyperlipidemia, Hypertension


Additional Past Medical History / Comment(s): PAST HX HYPOGLYCEMIA, NONE SINCE 

2001.  GOUT.  CURRENT RT INGUINAL HERNIA.


History of Any Multi-Drug Resistant Organisms: None Reported


Past Surgical History: Appendectomy, Joint Replacement


Additional Past Surgical History / Comment(s): COLONOSCOPY.  TOTAL LT KNEE , 

ROBOTIC HERNIA


Past Anesthesia/Blood Transfusion Reactions: No Reported Reaction


Additional Past Anesthesia/Blood Transfusion Reaction / Comment(s): ADOPTED -NO 

HISTORY


Past Psychological History: No Psychological Hx Reported


Smoking Status: Never smoker


Past Alcohol Use History: Occasional


Past Drug Use History: Marijuana





- Past Family History


  ** Mother


Family Medical History: Unable to Obtain


Additional Family Medical History / Comment(s): Pt is adopted and only knows 

heart disease ran on natural mother's side of family





  ** Father


Family Medical History: Diabetes Mellitus


Additional Family Medical History / Comment(s): Pt is adopted and only knows 

diabetes ran on natural father's side of family.





Medications and Allergies


                                Home Medications











 Medication  Instructions  Recorded  Confirmed  Type


 


No Known Home Medications  04/14/21 04/14/21 History








                                    Allergies











Allergy/AdvReac Type Severity Reaction Status Date / Time


 


No Known Allergies Allergy   Verified 04/14/21 12:53














Physical Exam


Vitals: 


                                   Vital Signs











  Temp Pulse Resp BP Pulse Ox


 


 04/15/21 07:00  96.2 F L  49 L  16  168/95  96


 


 04/15/21 01:40  98.0 F  45 L   173/79  97


 


 04/14/21 20:00  98.0 F  85   127/72  97


 


 04/14/21 15:37   67  18  


 


 04/14/21 14:35  98.0 F  45 L  18  139/85  97








                                Intake and Output











 04/14/21 04/15/21 04/15/21





 22:59 06:59 14:59


 


Intake Total   740


 


Balance   740


 


Intake:   


 


  Intake, IV Titration   500





  Amount   


 


    Sodium Chloride 0.9% 1,   500





    000 ml @ 100 mls/hr IV .   





    Q10H JO Rx#:[id number]   


 


  Oral   240


 


Other:   


 


  Voiding Method Toilet Toilet 


 


  # Voids 1 3 














Results





                                 04/14/21 11:36





                                 04/14/21 11:36


                                     Lipids











  04/15/21 Range/Units





  09:13 


 


Triglycerides  71  (<150)  mg/dL


 


Cholesterol  218 H  (<200)  mg/dL


 


HDL Cholesterol  43  (40-60)  mg/dL








                               Current Medications











Generic Name Dose Route Start Last Admin





  Trade Name Freq  PRN Reason Stop Dose Admin


 


Aspirin  81 mg  04/15/21 09:00  04/15/21 07:59





  Aspirin 81 Mg  PO   81 mg





  DAILY JO   Administration


 


Atorvastatin Calcium  40 mg  04/15/21 21:00 





  Atorvastatin 40 Mg Tab  PO  





  HS JO  


 


Clopidogrel Bisulfate  75 mg  04/15/21 09:00  04/15/21 07:59





  Clopidogrel 75 Mg Tab  PO   75 mg





  DAILY JO   Administration


 


Heparin Sodium (Porcine)  5,000 unit  04/15/21 16:00 





  Heparin Sodium,Porcine/Pf 5,000 Unit/0.5 Ml Syringe  SQ  





  Q8HR JO  


 


Sodium Chloride  1,000 mls @ 20 mls/hr  04/15/21 10:15  04/15/21 10:25





  Saline 0.9%  IV   Not Given





  .Q24H JO  








                                Intake and Output











 04/14/21 04/15/21 04/15/21





 22:59 06:59 14:59


 


Intake Total   740


 


Balance   740


 


Intake:   


 


  Intake, IV Titration   500





  Amount   


 


    Sodium Chloride 0.9% 1,   500





    000 ml @ 100 mls/hr IV .   





    Q10H JO Rx#:[id number]   


 


  Oral   240


 


Other:   


 


  Voiding Method Toilet Toilet 


 


  # Voids 1 3 








                                        





                                 04/14/21 11:36 





                                 04/14/21 11:36

## 2021-04-15 NOTE — P.GSCN
History of Present Illness


Consult date: 04/15/21


Reason for Consult: 





Symptomatic left internal carotid artery stenosis


Requesting physician: Monroe Mejias


History of present illness: 





This is a pleasant 61-year-old  male with a history of hyperlipidemia 

and hypertension that presented to the emergency department yesterday for vision

loss over the left eye.  He is a former smoker, smoked 1 PPD for 20 years, he 

quit 20 years ago. The patient states that last week he also noticed some vision

change of the left eye.  He states on Monday he noticed a sudden loss of vision 

in his left eye where became gray and he could not see out of that eye,  the 

episode lasted approximately 3-10 minutes.  He also felt weak during the episode

and tired.  Denying any focal weakness, numbness, speech difficulty or 

difficulty with swallowing.  He also states that prior to last 2 weeks he had 

been having left-sided headaches that were dull and intermittent.  He currently 

states his vision is normal other than having some possible blurriness.  He 

denies double vision, headache, shortness of breath, chest pain, nausea, 

vomiting, upper or lower extremity weakness, difficulty speaking, or difficulty 

swallowing.





CT of the brain shows no acute intracranial hemorrhage or midline shift


Carotid Doppler shows right ICA 96.6, with an ICA/CCA ratio 1.3, left .8,

ICA/CCA ratio 8.0.  Significant high-grade stenosis within the left ICA, intimal

thickening is present.  There are focal plaques present.  Atheromatous plaquing 

activity into significant flow-limiting stenosis greater than 70% within the 

left internal carotid artery.


CT angiogram of head and neck show confirmation of significant stenosis proximal

left internal carotid artery, complete occlusion over a short segment is felt 

present.  There is poor diminished flow in the left internal carotid artery dis

jacqueline to this versus the opposite right side.  Right internal carotid artery 

without significant stenosis.  There is some narrowing present measuring under 

50%.  Mild to moderate calcified plaque distal supraclinoid segment.





Review of Systems





A 14 point review systems was completed all pertinent positives and negatives as

stated in the HPI





Past Medical History


Past Medical History: Hyperlipidemia, Hypertension


Additional Past Medical History / Comment(s): PAST HX HYPOGLYCEMIA, NONE SINCE 

2001.  GOUT.  CURRENT RT INGUINAL HERNIA.


History of Any Multi-Drug Resistant Organisms: None Reported


Past Surgical History: Appendectomy, Joint Replacement


Additional Past Surgical History / Comment(s): COLONOSCOPY.  TOTAL LT KNEE , 

ROBOTIC HERNIA


Past Anesthesia/Blood Transfusion Reactions: No Reported Reaction


Additional Past Anesthesia/Blood Transfusion Reaction / Comm: ADOPTED -NO H

ISTORY


Past Psychological History: No Psychological Hx Reported


Smoking Status: Never smoker


Past Alcohol Use History: Occasional


Past Drug Use History: Marijuana





- Past Family History


  ** Mother


Family Medical History: Unable to Obtain


Additional Family Medical History / Comment(s): Pt is adopted and only knows 

heart disease ran on natural mother's side of family





  ** Father


Family Medical History: Diabetes Mellitus


Additional Family Medical History / Comment(s): Pt is adopted and only knows 

diabetes ran on natural father's side of family.





Medications and Allergies


                                Home Medications











 Medication  Instructions  Recorded  Confirmed  Type


 


No Known Home Medications  04/14/21 04/14/21 History








                                    Allergies











Allergy/AdvReac Type Severity Reaction Status Date / Time


 


No Known Allergies Allergy   Verified 04/14/21 12:53














Surgical - Exam


                                   Vital Signs











Temp Pulse Resp BP Pulse Ox


 


 98.3 F   52 L  18   136/83   93 L


 


 04/14/21 10:55  04/14/21 10:55  04/14/21 10:55  04/14/21 10:55  04/14/21 10:55














General appearance: The patient is alert, oriented, in no acute distress.


HET: Head is normocephalic and atraumatic.  Pupils are equal and reactive.  

Oropharynx is clear without lesions.


Neck: Supple without lymphadenopathy.  Trachea midline.  No audible carotid 

bruit.


Heart: S1 S2.  Regular rate and rhythm.


Lungs: Clear to auscultation.


Abdomen: Soft, nontender, nondistended.


Extremities: Normal skin color and turgor.  No cyanosis, rash, ulceration, 

clubbing, or edema.  Radial and pedal pulses are 2/4 bilaterally.  Palpable 

femoral pulses bilaterally.  


Neurological: No focal deficits.  Strength and sensation are grossly intact.





Results





- Labs





                                 04/14/21 11:36





                                 04/14/21 11:36


                  Abnormal Lab Results - Last 24 Hours (Table)











  04/14/21 Range/Units





  11:36 


 


Alkaline Phosphatase  37 L  ()  U/L








                                 Diabetes panel











  04/14/21 04/14/21 Range/Units





  11:36 11:36 


 


Sodium  140   (137-145)  mmol/L


 


Potassium  4.5   (3.5-5.1)  mmol/L


 


Chloride  107   ()  mmol/L


 


Carbon Dioxide  27   (22-30)  mmol/L


 


BUN  18   (9-20)  mg/dL


 


Creatinine  0.85   (0.66-1.25)  mg/dL


 


Glucose  95   (74-99)  mg/dL


 


Hemoglobin A1c   5.6  (4.0-6.0)  %


 


Calcium  9.6   (8.4-10.2)  mg/dL


 


AST  25   (17-59)  U/L


 


ALT  20   (4-49)  U/L


 


Alkaline Phosphatase  37 L   ()  U/L


 


Total Protein  7.1   (6.3-8.2)  g/dL


 


Albumin  4.2   (3.5-5.0)  g/dL








                                  Thyroid panel











  04/14/21 Range/Units





  11:36 


 


TSH  1.950  (0.350-5.500)  uIU/mL








                                  Calcium panel











  04/14/21 Range/Units





  11:36 


 


Calcium  9.6  (8.4-10.2)  mg/dL


 


Albumin  4.2  (3.5-5.0)  g/dL








                                 Pituitary panel











  04/14/21 04/14/21 Range/Units





  11:36 11:36 


 


Sodium  140   (137-145)  mmol/L


 


Potassium  4.5   (3.5-5.1)  mmol/L


 


Chloride  107   ()  mmol/L


 


Carbon Dioxide  27   (22-30)  mmol/L


 


BUN  18   (9-20)  mg/dL


 


Creatinine  0.85   (0.66-1.25)  mg/dL


 


Glucose  95   (74-99)  mg/dL


 


Calcium  9.6   (8.4-10.2)  mg/dL


 


TSH   1.950  (0.350-5.500)  uIU/mL








                                  Adrenal panel











  04/14/21 Range/Units





  11:36 


 


Sodium  140  (137-145)  mmol/L


 


Potassium  4.5  (3.5-5.1)  mmol/L


 


Chloride  107  ()  mmol/L


 


Carbon Dioxide  27  (22-30)  mmol/L


 


BUN  18  (9-20)  mg/dL


 


Creatinine  0.85  (0.66-1.25)  mg/dL


 


Glucose  95  (74-99)  mg/dL


 


Calcium  9.6  (8.4-10.2)  mg/dL


 


Total Bilirubin  0.8  (0.2-1.3)  mg/dL


 


AST  25  (17-59)  U/L


 


ALT  20  (4-49)  U/L


 


Alkaline Phosphatase  37 L  ()  U/L


 


Total Protein  7.1  (6.3-8.2)  g/dL


 


Albumin  4.2  (3.5-5.0)  g/dL














- Imaging


Additional studies: 





CT of the brain shows no acute intracranial hemorrhage or midline shift


Carotid Doppler shows right ICA 96.6, with an ICA/CCA ratio 1.3, left .8,

ICA/CCA ratio 8.0.  Significant high-grade stenosis within the left ICA, intimal

thickening is present.  There are focal plaques present.  Atheromatous plaquing 

activity into significant flow-limiting stenosis greater than 70% within the 

left internal carotid artery.


CT angiogram of head and neck show confirmation of significant stenosis proximal

left internal carotid artery, complete occlusion over a short segment is felt 

present.  There is poor diminished flow in the left internal carotid artery 

distal to this versus the opposite right side.  Right internal carotid artery 

without significant stenosis.  There is some narrowing present measuring under 

50%.  Mild to moderate calcified plaque distal supraclinoid segment.





Assessment and Plan


Assessment: 





1.  Left symptomatic internal carotid artery stenosis greater than 70%


2.  Visual changes within the left eye


3.  History of hypertension


4.  History of hyperlipidemia


Plan: 





1.  Continue aspirin and Lipitor 40 mg at at bedtime


2.  Appreciate recommendations from neurology


3.  Await MRI and echo results


4.  Carotid duplex and CTA reviewed


5.  Plan for left carotid artery endarterectomy tomorrow with Dr. Oakes.  The

procedure risks and benefits were discussed with the patient and his wife.  

Patient is willing to proceed with procedure.


6.  Nothing by mouth after midnight


7.  Discontinue Plavix











Thank you for this consultation, we will continue to follow next





The impression and plan of care has been dictated as directed.





Dr. Oakes


I performed a history and examination of this patient,  discussed the same with 

the dictator.  I agree with the dictator's note ,documented as a scribe.  Any 

additional findings or plans will be noted.

## 2021-04-15 NOTE — P.PN
Subjective


Progress Note Date: 04/15/21





Patient is a 61-year-old male with a known history of hypertension, 

hyperlipidemia currently not on any medications and occasional marijuana use 

presents to ER with complaints of blurry vision in the left eye.  Patient felt 

like everything was gray when he closes his right eye.  Patient was able to see 

well by closing left eye.  Patient had this episode occurred about 2 days ago 

and lasted about 3 to 5 minutes.  Since then patient has been having some blurry

vision.  Denied any fever or chills.  No cough or sputum production.  No recent 

infection.  No chest pain or shortness breath.  No palpitations.  No leg 

swelling.  No focal weakness or slurred speech.  No complaints of double vision.

 No jaw pain or temporal pain jaw claudication.


Patient states that he had [employer] COVID-19 vaccine about a month ago and started 

having occasional headaches few days after.


CT head showed no acute intracranial process


Chest x-ray showed no acute cardiopulmonary process


EKG showed marked sinus bradycardia.  Abnormal EKG.  Heart rate for 42


Laboratory data showed WBC 6.0 hemoglobin 15.2 and platelets 206


BUN 18 and creatinine 0.85 AST 25 ALT 20 alk phos 37 troponin less than 0.012 

and coronavirus PCR not detected.





4/15/2021


Patient is currently resting in the bed comfortably.  Denied any left eye vision

defects or blurry vision.  Denied any focal weakness.  No numbness or tingling. 

Denied any difficulty swallowing.


No complaints of chest pain or shortness of breath.  No nausea vomiting or 

abdominal pain or diarrhea.





CT angiogram of the head and neck showed significant stenosis of proximal left 

internal carotid artery, complete occlusion of a short segment is felt present. 

There is poor diminished flow in the left internal carotid artery distal to this

versus the opposite right side.


Vascular surgery was consulted and cardiology was consulted for clearance.  

Neurology is on board.


Vascular surgery is planning for left carotid endarterectomy scheduled for 

tomorrow.








                               Active Medications











Generic Name Dose Route Start Last Admin





  Trade Name Freq  PRN Reason Stop Dose Admin


 


Aspirin  81 mg  04/15/21 09:00  04/15/21 07:59





  Aspirin 81 Mg  PO   81 mg





  DAILY JO   Administration


 


Atorvastatin Calcium  40 mg  04/15/21 21:00 





  Atorvastatin 40 Mg Tab  PO  





  HS JO  


 


Clopidogrel Bisulfate  75 mg  04/15/21 09:00  04/15/21 07:59





  Clopidogrel 75 Mg Tab  PO   75 mg





  DAILY JO   Administration


 


Heparin Sodium (Porcine)  5,000 unit  04/15/21 16:00  04/15/21 15:20





  Heparin Sodium,Porcine/Pf 5,000 Unit/0.5 Ml Syringe  SQ   5,000 unit





  Q8HR JO   Administration


 


Sodium Chloride  1,000 mls @ 20 mls/hr  04/15/21 10:15  04/15/21 10:25





  Saline 0.9%  IV   Not Given





  .Q24H JO  














Objective





- Vital Signs


Vital signs: 


                                   Vital Signs











Temp  98.2 F   04/15/21 15:00


 


Pulse  50 L  04/15/21 15:00


 


Resp  16   04/15/21 15:00


 


BP  141/85   04/15/21 15:00


 


Pulse Ox  98   04/15/21 15:00








                                 Intake & Output











 04/14/21 04/15/21 04/15/21





 18:59 06:59 18:59


 


Intake Total   740


 


Balance   740


 


Weight 92.986 kg  


 


Intake:   


 


  Intake, IV Titration   500





  Amount   


 


    Sodium Chloride 0.9% 1,   500





    000 ml @ 100 mls/hr IV .   





    Q10H JO Rx#:[id number]   


 


  Oral   240


 


Other:   


 


  Voiding Method Toilet Toilet 


 


  # Voids 0 3 


 


  # Bowel Movements 0  














- Exam





PHYSICAL EXAMINATION: 


Patient is lying in the bed comfortably, no acute distress, awake alert and 

oriented.. 


HEENT: Normocephalic. Neck is supple. Pupils reactive. Nostrils clear. Oral 

cavity is moist. Ears reveal no drainage. 


Neck reveals no JVD, carotid bruits, or thyromegaly. 


CHEST EXAMINATION: Trachea is central. Symmetrical expansion. Lung fields clear 

to auscultation and percussion. 


CARDIAC: Normal S1, S2 with no gallops. No murmurs 


ABDOMEN: Soft. Bowel sounds normal. No organomegaly. No abdominal bruits. 


Extremities: reveal no edema.  No clubbing or cyanosis


Neurologically awake, alert, oriented x3 with well-coordinated movements.  No 

focal deficits noted


Skin: No rash or skin lesions. 


Psychiatric: Coperative.  Nonsuicidal


Musculoskeletal: No joint swelling or deformity.  Normal range of motion.








- Labs


CBC & Chem 7: 


                                 04/14/21 11:36





                                 04/14/21 11:36


Labs: 


                  Abnormal Lab Results - Last 24 Hours (Table)











  04/15/21 Range/Units





  09:13 


 


Cholesterol  218 H  (<200)  mg/dL


 


LDL Cholesterol, Calc  161 H  (0-99)  mg/dL














Assessment and Plan


Assessment: 





Transient left eye vision loss.  Rule out acute CVA. MRI pending


Left internal carotid artery proximal stenosis greater than 70%.


Sinus bradycardia


Hypertension not on any medications


Hyperlipidemia not on any medications at home


History of marijuana use


DVT prophylaxis





Plan:


Patient will be continued telemetry monitoring.  Started on aspirin and Plavix 

as per neurology recommendations.  Patient was given a dose of aspirin 325 mg 

once in the ER.  Loading dose of Plavix was given.


Stroke work-up including MRI of the brain and CT angiogram of the head and neck 

was ordered.  2D echocardiogram


TSH A1c level and B12 and CRP levels WNL.  


Neurology is on board.  


Patient was found to have significant left internal carotid stenosis.  Vascular 

surgery is planning for endarterectomy tomorrow.


Further recommendations based on the clinical course.


Time with Patient: Greater than 30

## 2021-04-15 NOTE — ECHOF
Referral Reason:Thrombus



MEASUREMENTS

--------

HEIGHT: 185.4 cm

WEIGHT: 93.0 kg

BP: 128/80

RVIDd:   3.4 cm     (< 3.3)

IVSd:   1.3 cm     (0.6 - 1.1)

LVIDd:   4.9 cm     (3.9 - 5.3)

LVPWd:   1.2 cm     (0.6 - 1.1)

IVSs:   1.7 cm

LVIDs:   3.1 cm

LVPWs:   1.6 cm

LA Diam:   3.7 cm     (2.7 - 3.8)

LAESV Index (A-L):   28.66 ml/m

Ao Diam:   4.1 cm     (2.0 - 3.7)

AV Cusp:   2.4 cm     (1.5 - 2.6)

MV EXCURSION:   15.293 mm     (> 18.000)

MV EF SLOPE:   141 mm/s     (70 - 150)

EPSS:   0.5 cm

MV E Amos:   1.03 m/s

MV DecT:   187 ms

MV A Amos:   0.60 m/s

MV E/A Ratio:   1.72 

RAP:   15.00 mmHg

RVSP:   49.77 mmHg







FINDINGS

--------

Sinus rhythm.

This was a technically good study.

The left ventricular size is normal.   There is mild concentric left ventricular hypertrophy.   Overa
ll left ventricular systolic function is normal with, an EF between 60 - 65 %.

The right ventricle is mildly enlarged.

Normal LA  size by volume 22+/-6 ml/m2.

The right atrium is normal in size.

Interatrial and interventricular septum intact.

The aortic valve is trileaflet, and appears structurally normal. No aortic stenosis or regurgitation.


Mild mitral regurgitation is present.

Mild tricuspid regurgitation present.   There is moderate pulmonary hypertension.   The right ventric
ular systolic pressure, as measured by Doppler, is 49.77mmHg.

Trace/mild (physiologic)  pulmonic regurgitation.

The aortic root is dilated measuring 4.1cm.

The inferior vena cava is dilated with poor inspiratory collapse which is consistent with estimated r
ight atrial pressure of 15mmHg.

There is no pericardial effusion.



CONCLUSIONS

--------

1. The left ventricular size is normal.

2. There is mild concentric left ventricular hypertrophy.

3. Overall left ventricular systolic function is normal with, an EF between 60 - 65 %.

4. The right ventricle is mildly enlarged.

5. Normal LA size by volume 22+/-6 ml/m2.

6. The aortic valve is trileaflet, and appears structurally normal. No aortic stenosis or regurgitati
on.

7. Mild mitral regurgitation is present.

8. Mild tricuspid regurgitation present.

9. There is moderate pulmonary hypertension.

10. The right ventricular systolic pressure, as measured by Doppler, is 49.77mmHg.

11. Trace/mild (physiologic)  pulmonic regurgitation.

12. The aortic root is dilated measuring 4.1cm.

13. The inferior vena cava is dilated with poor inspiratory collapse which is consistent with estimat
ed right atrial pressure of 15mmHg.

14. There is no pericardial effusion.





SONOGRAPHER: Willa Thomas RDCS

## 2021-04-15 NOTE — MR
EXAMINATION TYPE: MR brain wo con

 

DATE OF EXAM: 4/15/2021

 

COMPARISON: CT brain from yesterday

 

HISTORY: Acute onset neural deficit on admission yesterday, left-sided vision loss.

 

TECHNIQUE: Multiplanar, multisequence imaging of the brain and brainstem is performed without IV cont
rast.

 

FINDINGS:  

Diffusion weighted images demonstrate no evidence of a recent infarct or other diffusion abnormality.


 

The ventricular system and cisternal spaces are normal in size and appearance.  The brain volume is a
ge appropriate. Scattered foci of T2 hyperintensity are seen throughout the white matter bilaterally.
 Approximately 20 small scattered lesions are seen. Lesions are nonspecific in appearance and distrib
ution.

 

Midline structures demonstrate normal morphology.  The craniocervical junction appears within normal 
limits. Normal vascular flow voids are present. Mild mucosal thickening involving the ethmoid sinuses
 bilaterally is present. The globes are intact bilaterally.

 

IMPRESSION: No MRI evidence for a recent infarct. Mild nonspecific white matter changes most likely o
n basis of product of chronic small vessel ischemic change in position of this age.

## 2021-04-16 LAB
ANION GAP SERPL CALC-SCNC: 10 MMOL/L
BASOPHILS # BLD AUTO: 0 K/UL (ref 0–0.2)
BASOPHILS NFR BLD AUTO: 0 %
BUN SERPL-SCNC: 15 MG/DL (ref 9–20)
CALCIUM SPEC-MCNC: 10 MG/DL (ref 8.4–10.2)
CHLORIDE SERPL-SCNC: 104 MMOL/L (ref 98–107)
CO2 SERPL-SCNC: 25 MMOL/L (ref 22–30)
EOSINOPHIL # BLD AUTO: 0.1 K/UL (ref 0–0.7)
EOSINOPHIL NFR BLD AUTO: 2 %
ERYTHROCYTE [DISTWIDTH] IN BLOOD BY AUTOMATED COUNT: 5.76 M/UL (ref 4.3–5.9)
ERYTHROCYTE [DISTWIDTH] IN BLOOD: 13.1 % (ref 11.5–15.5)
GLUCOSE BLD-MCNC: 110 MG/DL (ref 75–99)
GLUCOSE SERPL-MCNC: 98 MG/DL (ref 74–99)
HCT VFR BLD AUTO: 51.8 % (ref 39–53)
HGB BLD-MCNC: 17.6 GM/DL (ref 13–17.5)
LYMPHOCYTES # SPEC AUTO: 1.6 K/UL (ref 1–4.8)
LYMPHOCYTES NFR SPEC AUTO: 23 %
MCH RBC QN AUTO: 30.6 PG (ref 25–35)
MCHC RBC AUTO-ENTMCNC: 34.1 G/DL (ref 31–37)
MCV RBC AUTO: 89.8 FL (ref 80–100)
MONOCYTES # BLD AUTO: 0.3 K/UL (ref 0–1)
MONOCYTES NFR BLD AUTO: 4 %
NEUTROPHILS # BLD AUTO: 4.8 K/UL (ref 1.3–7.7)
NEUTROPHILS NFR BLD AUTO: 69 %
PLATELET # BLD AUTO: 221 K/UL (ref 150–450)
POTASSIUM SERPL-SCNC: 4.3 MMOL/L (ref 3.5–5.1)
SODIUM SERPL-SCNC: 139 MMOL/L (ref 137–145)
WBC # BLD AUTO: 6.9 K/UL (ref 3.8–10.6)

## 2021-04-16 RX ADMIN — NITROGLYCERIN SCH: 20 INJECTION INTRAVENOUS at 18:26

## 2021-04-16 RX ADMIN — HEPARIN SODIUM SCH UNIT: 5000 INJECTION INTRAVENOUS; SUBCUTANEOUS at 02:16

## 2021-04-16 RX ADMIN — PHENYLEPHRINE HYDROCHLORIDE SCH: 10 INJECTION INTRAVENOUS at 18:27

## 2021-04-16 RX ADMIN — PHENYLEPHRINE HYDROCHLORIDE SCH: 10 INJECTION INTRAVENOUS at 23:47

## 2021-04-16 RX ADMIN — HEPARIN SODIUM SCH: 5000 INJECTION INTRAVENOUS; SUBCUTANEOUS at 19:40

## 2021-04-16 RX ADMIN — POTASSIUM CHLORIDE SCH MLS/HR: 14.9 INJECTION, SOLUTION INTRAVENOUS at 21:52

## 2021-04-16 RX ADMIN — CEFAZOLIN SCH: 330 INJECTION, POWDER, FOR SOLUTION INTRAMUSCULAR; INTRAVENOUS at 18:27

## 2021-04-16 RX ADMIN — HEPARIN SODIUM SCH UNIT: 5000 INJECTION INTRAVENOUS; SUBCUTANEOUS at 23:51

## 2021-04-16 RX ADMIN — ATORVASTATIN CALCIUM SCH MG: 40 TABLET, FILM COATED ORAL at 20:42

## 2021-04-16 RX ADMIN — POTASSIUM CHLORIDE SCH: 14.9 INJECTION, SOLUTION INTRAVENOUS at 19:40

## 2021-04-16 RX ADMIN — HEPARIN SODIUM SCH: 5000 INJECTION INTRAVENOUS; SUBCUTANEOUS at 09:13

## 2021-04-16 RX ADMIN — ASPIRIN 81 MG CHEWABLE TABLET SCH: 81 TABLET CHEWABLE at 09:13

## 2021-04-16 NOTE — P.PN
Subjective


Progress Note Date: 04/16/21


Principal diagnosis: 





Left eye vision deficit resolved now.


Left ICA hemodynamically significant stenosis





Patient is a 61-year-old male with a known history of hypertension, hyperl

ipidemia currently not on any medications and occasional marijuana use presents 

to ER with complaints of blurry vision in the left eye.  Patient felt like 

everything was gray when he closes his right eye.  Patient was able to see well 

by closing left eye.  Patient had this episode occurred about 2 days ago and 

lasted about 3 to 5 minutes.  Since then patient has been having some blurry 

vision.  Denied any fever or chills.  No cough or sputum production.  No recent 

infection.  No chest pain or shortness breath.  No palpitations.  No leg 

swelling.  No focal weakness or slurred speech.  No complaints of double vision.

 No jaw pain or temporal pain jaw claudication.


Patient states that he had [employer] COVID-19 vaccine about a month ago and started 

having occasional headaches few days after.


CT head showed no acute intracranial process


Chest x-ray showed no acute cardiopulmonary process


EKG showed marked sinus bradycardia.  Abnormal EKG.  Heart rate for 42


Laboratory data showed WBC 6.0 hemoglobin 15.2 and platelets 206


BUN 18 and creatinine 0.85 AST 25 ALT 20 alk phos 37 troponin less than 0.012 

and coronavirus PCR not detected.





4/15/2021


Patient is currently resting in the bed comfortably.  Denied any left eye vision

defects or blurry vision.  Denied any focal weakness.  No numbness or tingling. 

Denied any difficulty swallowing.


No complaints of chest pain or shortness of breath.  No nausea vomiting or 

abdominal pain or diarrhea.





CT angiogram of the head and neck showed significant stenosis of proximal left 

internal carotid artery, complete occlusion of a short segment is felt present. 

There is poor diminished flow in the left internal carotid artery distal to this

versus the opposite right side.


Vascular surgery was consulted and cardiology was consulted for clearance.  

Neurology is on board.


Vascular surgery is planning for left carotid endarterectomy scheduled for to

[place].





4/16/2021


Patient is currently lying in the bed comfortably.  No complaints of chest pain 

or shortness of breath.  No complaints of left eye vision deficit.


MRI of the brain showed no evidence of recent infarct.  Mild nonspecific white 

matter changes most likely on the basis of


Chronic small vessel ischemic changes in position of the ears.


Laboratory data reviewed.





Patient is scheduled for left carotid endarterectomy today.








                               Active Medications











Generic Name Dose Route Start Last Admin





  Trade Name Fiona  PRN Reason Stop Dose Admin


 


Aspirin  81 mg  04/15/21 09:00  04/15/21 07:59





  Aspirin 81 Mg  PO   81 mg





  DAILY JO   Administration


 


Atorvastatin Calcium  40 mg  04/15/21 21:00 





  Atorvastatin 40 Mg Tab  PO  





  HS JO  


 


Clopidogrel Bisulfate  75 mg  04/15/21 09:00  04/15/21 07:59





  Clopidogrel 75 Mg Tab  PO   75 mg





  DAILY JO   Administration


 


Heparin Sodium (Porcine)  5,000 unit  04/15/21 16:00  04/15/21 15:20





  Heparin Sodium,Porcine/Pf 5,000 Unit/0.5 Ml Syringe  SQ   5,000 unit





  Q8HR JO   Administration


 


Sodium Chloride  1,000 mls @ 20 mls/hr  04/15/21 10:15  04/15/21 10:25





  Saline 0.9%  IV   Not Given





  .Q24H [facility]  














Objective





- Vital Signs


Vital signs: 


                                   Vital Signs











Temp  97.6 F   04/16/21 11:45


 


Pulse  52 L  04/16/21 12:47


 


Resp  17   04/16/21 14:25


 


BP  170/90   04/16/21 14:25


 


Pulse Ox  98   04/16/21 14:25








                                 Intake & Output











 04/15/21 04/16/21 04/16/21





 18:59 06:59 18:59


 


Intake Total 740  240


 


Balance 740  240


 


Intake:   


 


  Intake, IV Titration 500  





  Amount   


 


    Sodium Chloride 0.9% 1, 500  





    000 ml @ 100 mls/hr IV .   





    Q10H [facility] Rx#:[id number]   


 


  Oral 240  240














- Exam





PHYSICAL EXAMINATION: 


Patient is lying in the bed comfortably, no acute distress, awake alert and 

oriented.. 


HEENT: Normocephalic. Neck is supple. Pupils reactive. Nostrils clear. Oral 

cavity is moist. Ears reveal no drainage. 


Neck reveals no JVD, carotid bruits, or thyromegaly. 


CHEST EXAMINATION: Trachea is central. Symmetrical expansion. Lung fields clear 

to auscultation and percussion. 


CARDIAC: Normal S1, S2 with no gallops. No murmurs 


ABDOMEN: Soft. Bowel sounds normal. No organomegaly. No abdominal bruits. 


Extremities: reveal no edema.  No clubbing or cyanosis


Neurologically awake, alert, oriented x3 with well-coordinated movements.  No 

focal deficits noted


Skin: No rash or skin lesions. 


Psychiatric: Coperative.  Nonsuicidal


Musculoskeletal: No joint swelling or deformity.  Normal range of motion.








- Labs


CBC & Chem 7: 


                                 04/16/21 10:03





                                 04/16/21 10:03


Labs: 


                  Abnormal Lab Results - Last 24 Hours (Table)











  04/16/21 Range/Units





  10:03 


 


Hgb  17.6 H  (13.0-17.5)  gm/dL














Assessment and Plan


Assessment: 





Transient left eye vision loss.  Rule out acute CVA. MRI showed no acute infarct


Left internal carotid artery proximal stenosis greater than 70%.Patient is 

scheduled for left carotid endarterectomy today.


Sinus bradycardia


Hypertension not on any medications


Hyperlipidemia not on any medications at home


History of marijuana use


DVT prophylaxis





Plan:


Patient will be continued telemetry monitoring.  Started on aspirin and Plavix 

as per neurology recommendations.  Patient was given a dose of aspirin 325 mg 

once in the ER.  Loading dose of Plavix was given.


Stroke work-up including MRI of the brain and CT angiogram of the head and neck 

was ordered.  2D echocardiogram


TSH A1c level and B12 and CRP levels WNL.  


Neurology and vasc sx is on board.  


Patient was found to have significant left internal carotid stenosis.  Vascular 

surgery is planning for endarterectomy tomorrow.


Further recommendations based on the clinical course.


Time with Patient: Greater than 30

## 2021-04-16 NOTE — CONS
HAYLEY Morataya is a 61-year-old gentleman who was admitted to the hospital with TIA and has left

carotid stenosis for which he is to undergo surgery today and we were consulted for

preop cardiac evaluation.  The patient is doing well and did not have any new

neurological deficits.  He is currently on aspirin and Lipitor.



PHYSICAL EXAMINATION:

On exam, patient is comfortable at rest.  Blood pressure is elevated at 177/96, heart

rate is 64 beats per minute.  Afebrile. Chest exam reveals good air entry bilaterally.

Heart exam reveals first and second heart sounds.  No gallop.  Examination of

extremities did not reveal any edema.



ASSESSMENT:

1. Transient ischemic attack secondary to carotid stenosis.

2. Preop cardiac evaluation.

3. Hypertension.



PLAN:

If the patient continues to be hypertensive after surgery, please start the patient on

an ACE inhibitor. Please arrange follow up with Cardiology in 4 weeks time.





MMMATEUSL / ROMEON: [id number] / Job#: [id number]

## 2021-04-16 NOTE — P.OP
Date of Procedure: 04/16/21


Preoperative Diagnosis: 


Hemodynamically severe left ICA stenosis with history of transient ischemic 

attack


Postoperative Diagnosis: 


Same.


Procedure(s) Performed: 


Carotid endarterectomy with patch angioplasty.


Anesthesia: JENNIFFERA


Surgeon: Donnie Story


Estimated Blood Loss (ml): 50


Urine output (ml): 0


Pathology: other (#1: left cervical lymph node.  #2 left carotid plaque.)


Condition: stable


Disposition: ICU


Indications for Procedure: 


Patient is a 61-year-old male who had presented with a history of 2 separate 

episodes of left hemispheric through vascular ischemia which has resolved.  

After the second event the patient presented the emergency room.  Carotid duplex

imaging was performed which demonstrated a peak systolic velocity within the 

left ICA at almost 400 cm/s with an ICA to CCA ratio of 8.  CT angiogram 

confirmed hemodynamically severe left ICA stenosis.  The patient's symptoms have

remained at baseline and patient is offered a carotid endarterectomy.  The 

procedure, risks as well as benefits were discussed with the patient and his 

wife.  All questions were answered to patient and family satisfaction.  Consent 

form was signed.


Description of Procedure: 


Patient was brought the upper and placed in the supine position and administered

general endotracheal anesthesia delivered by the department of anesthesiology.  

Patient's left lateral neck supraclavicular and anterior chest wall were 

sterilely prepped and draped in usual manner.  Patient did receive 2 g of Ancef 

intravenously in the perioperative phase for prophylactic antibiotic therapy.





Incision was made along the anterior border sternocleidomastoid muscle and 

carried down through the subcu change tissues.  Hemostasis was achieved using 

electrocautery.  Incision was deepened through the platysma and carried along 

the anterior border sternocleidomastoid muscle which was retracted posteriorly. 

The facial vein was identified and ligated with silk suture and divided.  The 

carotid sheath was entered.  The common carotid artery was identified and 

dissected free of investing tissues and encircled with Vesseloops.  The 

dissection was carried to the level the carotid bulb.  This point time the 

patient became slightly hypotensive and bradycardic and approximately 0.5 ML's 

of 1% Xylocaine was injected into the bulb area which resolved the patient's 

transient hypotension and bradycardia.  Dissection was then carried along the 

external carotid artery where the superior thyroid and external carotid artery 

were dissected free of investing tissues and encircled with Vesseloops.  The 

dissection was carried cephalad to a point passed the plaque where the artery 

was encircled Vesseloops.  Both the hypoglossal and vagus nerves were identified

and left undisturbed.  The patient received an heparin intravenously and ACT was

drawn.  This was straightened adequate anticoagulation with an ACT of 

approximately 305.





Vessel loops surrounding the external carotid and superior thyroid arteries were

drawn closed followed by the internal carotid and common carotid arteries.  

Arteriotomy was made in the common and extended through the bulb into the 

internal carotid artery.  Stump pressure was measured and was found to be 80 

mmHg mean and thus no shunting was thought necessary.





Endarterectomy was begun done at the common level and extended into the bulb.  

Retraction endarterectomy was performed on the external segment.  Endarterectomy

was continued into the internal carotid artery with the distal end feathered off

quite well without the need for tacking suture.  Patch angioplasty closure of 

the arteriotomy was performed with bovine pericardial patch and 6-0 Prolene 

suture placed in running fashion.  Just prior to completion of the anastomotic 

line the internal carotid artery was backbled and no thrombus was retrieved.  

The internal was then occluded at its origin and the external carotid and common

carotid arteries were flushed and again no thrombus was retrieved.  The 

anastomotic line was completed.  Vesseloops surrounding the internal carotid 

artery were loosened and the artery clamped at its origin.  Vessel loops 

surrounding the superior thyroid, external carotid and finally the common 

carotid arteries were released thus flushing any potential debris into the 

external system.  Flow was then restored into the internal system.  Excellent 

pulse in the distal internal carotid artery was identified.  The patient receive

d 25 mg of protamine to help reverse the heparin effect.  Topical thrombin and 

Gelfoam was laced about the anastomotic line followed by application of FloSeal.

 This allowed for hemostasis





With the above findings noted the wound was gently irrigated.  Deep tissues were

closed with 3-0 Vicryl dermis was closed with 4-0 Monocryl placed in running 

intradermal fashion.  Skin glue was placed along the incision.





Patient tolerated procedure well awoke, without apparent neurologic complication

and was transferred to the recovery area satisfactory and stable condition.

## 2021-04-17 VITALS — DIASTOLIC BLOOD PRESSURE: 102 MMHG | RESPIRATION RATE: 15 BRPM | SYSTOLIC BLOOD PRESSURE: 155 MMHG | HEART RATE: 72 BPM

## 2021-04-17 VITALS — TEMPERATURE: 98.1 F

## 2021-04-17 LAB
ALBUMIN SERPL-MCNC: 3.7 G/DL (ref 3.5–5)
ALP SERPL-CCNC: 44 U/L (ref 38–126)
ALT SERPL-CCNC: 18 U/L (ref 4–49)
ANION GAP SERPL CALC-SCNC: 8 MMOL/L
AST SERPL-CCNC: 21 U/L (ref 17–59)
BASOPHILS # BLD AUTO: 0 K/UL (ref 0–0.2)
BASOPHILS NFR BLD AUTO: 0 %
BUN SERPL-SCNC: 14 MG/DL (ref 9–20)
CALCIUM SPEC-MCNC: 9.5 MG/DL (ref 8.4–10.2)
CHLORIDE SERPL-SCNC: 103 MMOL/L (ref 98–107)
CO2 SERPL-SCNC: 25 MMOL/L (ref 22–30)
EOSINOPHIL # BLD AUTO: 0 K/UL (ref 0–0.7)
EOSINOPHIL NFR BLD AUTO: 0 %
ERYTHROCYTE [DISTWIDTH] IN BLOOD BY AUTOMATED COUNT: 5.16 M/UL (ref 4.3–5.9)
ERYTHROCYTE [DISTWIDTH] IN BLOOD: 13.7 % (ref 11.5–15.5)
GLUCOSE SERPL-MCNC: 105 MG/DL (ref 74–99)
HCT VFR BLD AUTO: 47.1 % (ref 39–53)
HGB BLD-MCNC: 15.2 GM/DL (ref 13–17.5)
LYMPHOCYTES # SPEC AUTO: 1.3 K/UL (ref 1–4.8)
LYMPHOCYTES NFR SPEC AUTO: 12 %
MCH RBC QN AUTO: 29.5 PG (ref 25–35)
MCHC RBC AUTO-ENTMCNC: 32.3 G/DL (ref 31–37)
MCV RBC AUTO: 91.3 FL (ref 80–100)
MONOCYTES # BLD AUTO: 0.6 K/UL (ref 0–1)
MONOCYTES NFR BLD AUTO: 5 %
NEUTROPHILS # BLD AUTO: 9.2 K/UL (ref 1.3–7.7)
NEUTROPHILS NFR BLD AUTO: 82 %
PLATELET # BLD AUTO: 221 K/UL (ref 150–450)
POTASSIUM SERPL-SCNC: 4.3 MMOL/L (ref 3.5–5.1)
PROT SERPL-MCNC: 6.4 G/DL (ref 6.3–8.2)
SODIUM SERPL-SCNC: 136 MMOL/L (ref 137–145)
WBC # BLD AUTO: 11.3 K/UL (ref 3.8–10.6)

## 2021-04-17 RX ADMIN — ASPIRIN 81 MG CHEWABLE TABLET SCH MG: 81 TABLET CHEWABLE at 08:14

## 2021-04-17 RX ADMIN — HYDROCODONE BITARTRATE AND ACETAMINOPHEN PRN EACH: 5; 325 TABLET ORAL at 08:15

## 2021-04-17 RX ADMIN — HYDROCODONE BITARTRATE AND ACETAMINOPHEN PRN EACH: 5; 325 TABLET ORAL at 01:50

## 2021-04-17 RX ADMIN — NITROGLYCERIN SCH: 20 INJECTION INTRAVENOUS at 07:55

## 2021-04-17 RX ADMIN — HEPARIN SODIUM SCH UNIT: 5000 INJECTION INTRAVENOUS; SUBCUTANEOUS at 08:14

## 2021-04-17 NOTE — P.PN
Subjective


Progress Note Date: 04/17/21





Hood is seen and examined.  Overall he is doing quite well.  He had no issues 

overnight.  His neck is soft clean dry and intact.  No hematoma.  Cranial nerves

II through XII grossly intact.  At this point from my standpoint he is found to 

be in satisfactory condition for discharge.  Home going instructions were given 

to him.  He'll be given prescriptions for aspirin, Plavix and be continued on 

his statin medication.  He should follow up with Dr. Tapia in the office in 

approximately 2 weeks





Objective





- Vital Signs


Vital signs: 


                                   Vital Signs











Temp  98.1 F   04/17/21 08:00


 


Pulse  81   04/17/21 10:00


 


Resp  18   04/17/21 10:00


 


BP  153/104   04/17/21 10:00


 


Pulse Ox  95   04/17/21 10:00








                                 Intake & Output











 04/16/21 04/17/21 04/17/21





 18:59 06:59 18:59


 


Intake Total 1642 1100 200


 


Output Total 50 1650 400


 


Balance 1592 -550 -200


 


Weight  95 kg 


 


Intake:   


 


  IV 1402 1100 200


 


    Lactated Ringers 1,000 ml  1100 200





    @ 100 mls/hr IV .Q10H   





    JO Rx#:[id number]   


 


  Oral 240  


 


Output:   


 


  Urine  1650 400


 


  Estimated Blood Loss 50  


 


Other:   


 


  Voiding Method  Urinal Urinal


 


  # Voids   0








                       ABP, PAP, CO, CI - Last Documented











Arterial Blood Pressure        133/88

















- Labs


CBC & Chem 7: 


                                 04/17/21 04:00





                                 04/17/21 04:00


Labs: 


                  Abnormal Lab Results - Last 24 Hours (Table)











  04/16/21 04/16/21 04/17/21 Range/Units





  10:03 18:48 04:00 


 


WBC    11.3 H  (3.8-10.6)  k/uL


 


Hgb  17.6 H    (13.0-17.5)  gm/dL


 


Neutrophils #    9.2 H  (1.3-7.7)  k/uL


 


Sodium     (137-145)  mmol/L


 


Glucose     (74-99)  mg/dL


 


POC Glucose (mg/dL)   110 H   (75-99)  mg/dL














  04/17/21 Range/Units





  04:00 


 


WBC   (3.8-10.6)  k/uL


 


Hgb   (13.0-17.5)  gm/dL


 


Neutrophils #   (1.3-7.7)  k/uL


 


Sodium  136 L  (137-145)  mmol/L


 


Glucose  105 H  (74-99)  mg/dL


 


POC Glucose (mg/dL)   (75-99)  mg/dL

## 2022-07-20 ENCOUNTER — HOSPITAL ENCOUNTER (EMERGENCY)
Dept: HOSPITAL 47 - EC | Age: 62
Discharge: HOME | End: 2022-07-20
Payer: COMMERCIAL

## 2022-07-20 VITALS — SYSTOLIC BLOOD PRESSURE: 152 MMHG | DIASTOLIC BLOOD PRESSURE: 94 MMHG | HEART RATE: 74 BPM

## 2022-07-20 VITALS — TEMPERATURE: 98.2 F | RESPIRATION RATE: 16 BRPM

## 2022-07-20 DIAGNOSIS — M54.16: Primary | ICD-10-CM

## 2022-07-20 DIAGNOSIS — I10: ICD-10-CM

## 2022-07-20 PROCEDURE — 72100 X-RAY EXAM L-S SPINE 2/3 VWS: CPT

## 2022-07-20 PROCEDURE — 99284 EMERGENCY DEPT VISIT MOD MDM: CPT

## 2022-07-20 PROCEDURE — 72202 X-RAY EXAM SI JOINTS 3/> VWS: CPT

## 2022-07-20 PROCEDURE — 96372 THER/PROPH/DIAG INJ SC/IM: CPT

## 2022-07-20 PROCEDURE — 72131 CT LUMBAR SPINE W/O DYE: CPT

## 2022-07-20 NOTE — XR
EXAMINATION TYPE: XR sacroiliac joint comp BILAT

 

DATE OF EXAM: 7/20/2022

 

COMPARISON: NONE

 

HISTORY: Pain

 

TECHNIQUE: 3 views

 

FINDINGS: The sacroiliac joints appear normal. There are no erosions. There is no sclerosis.

 

IMPRESSION: Normal exam. No evidence of sacroiliitis.

## 2022-07-20 NOTE — XR
EXAMINATION TYPE: XR lumbar spine 2 or 3V

 

DATE OF EXAM: 7/20/2022

 

COMPARISON: NONE

 

HISTORY: Back pain

 

TECHNIQUE: 3 view

 

FINDINGS: Lumbar vertebra have normal alignment. There is spurring of the endplates throughout the yue
mbar spine. No compression fracture. Posterior elements are intact. There is mild narrowing of the yue
mbar disc spaces. Sacroiliac joints are intact.

 

IMPRESSION: Multilevel ordinary spondylotic changes. No fracture.

## 2022-07-20 NOTE — CT
EXAMINATION TYPE: CT lumbar spine wo con

 

DATE OF EXAM: 7/20/2022

 

COMPARISON: Plain film 7/20/2022

 

HISTORY: Low back pain

 

CT DLP: 981.6 mGycm

Automated exposure control for dose reduction was used.

 

An unenhanced CT of the lumbar spine was performed.  Bone and soft tissue window settings are submitt
ed as well as coronal and sagittal reconstructions. 

 

FINDINGS:

Lumbar vertebral bodies are intact. There is preserved height and bone mineralization. Minimal retrol
isthesis grade 1 L4-5, anterolisthesis grade 1 at L3-4. Loss of disc height is present especially at 
L2-3 greater than L3-4 and L4-5. Vacuum phenomenon present at L4-5. There is multilevel spondylosis. 
Incidental note made of diverticular change in the sigmoid colon. Probable cortical cyst present at t
he upper pole the left kidney measures 14 mm.

 

L1-L2: Normal disc space height.  No disc herniation protrusion or central stenosis.  No facet joint 
arthropathy.  No evidence for foraminal encroachment.

 

L2-L3: Posterior extension endplate disc complex causes mild anterior mass effect on the thecal sac. 
No significant foraminal encroachment or spinal stenosis.

 

L3-L4: Listhesis contributes to cause spinal stenosis, there is hypertrophic change of the facets cau
sing posterior lateral mass effect on the thecal sac and resulting in moderate to severe spinal steno
sis, there is an accompanying mild circumferential posterior disc bulge. There may be some mild left-
sided foraminal encroachment due to circumferential extension endplate disc complex.

 

L4-L5: Circumferential posterior disc bulge causes anterior mass effect on the thecal sac. Hypertroph
y ligamentum flavum causes some posterior lateral mass effect on the thecal sac. No significant spina
l stenosis or foraminal encroachment.

 

L5-S1: Posterior broad-based disc bulge causes mild anterior mass effect on the thecal sac. Circumfer
ential extension of endplate disc complex results in foraminal encroachment right greater than left. 
There is facet arthropathy change. No evident spinal stenosis.

 

IMPRESSION:

Spinal stenosis, degenerative disc disease, facet arthropathy and foraminal encroachment as described
..

## 2022-07-20 NOTE — ED
Back Pain HPI





- General


Chief Complaint: Back Pain/Injury


Stated Complaint: Lower back pain


Time Seen by Provider: 22 06:11


Source: patient, RN notes reviewed


Limitations: no limitations





- History of Present Illness


Initial Comments: 


This is a 62 year old male who presents to the emergency department for right 

lower back pain.  States this began approximately one week ago and was sudden in

onset.  The pain has started to radiate down the posterior aspect of the right 

thigh and wraparound to the front of the calf, going into the right foot.  

States that he has not had any relief with Advil and has never experienced 

anything like this in the past.  He does believe that he further exacerbated 

this 4 days ago, as he was on a long road trip and was taking care of his da

gabo's dogs.  He does note that applying heat seems to make the pain worse.  

Denies any loss of bowel or bladder control or saddle anesthesia.





Denies any fevers, chills, sore throat, cough, dyspnea, chest pain, 

palpitations, abdominal pain, nausea, vomiting, diarrhea, or headaches.





MD Complaint: back pain


Onset/Timin


-: week(s)


Similar Symptoms Previously: No





- Related Data


                                  Previous Rx's











 Medication  Instructions  Recorded


 


Aspirin 81 mg PO DAILY #30 chew 21


 


Atorvastatin [Lipitor] 40 mg PO HS #30 tab 21


 


Clopidogrel Bisulfate [Plavix] 75 mg PO DAILY #30 tab 21


 


Diclofenac Sodium [Voltaren] 75 mg PO BID PRN #25 tab 22


 


HYDROcodone/APAP 5-325MG [Norco 1 tab PO Q6HR PRN 3 Days #12 tab 22





5-325]  


 


Metaxalone [Skelaxin] 800 mg PO TID PRN #25 tablet 22











                                    Allergies











Allergy/AdvReac Type Severity Reaction Status Date / Time


 


No Known Allergies Allergy   Verified 22 06:06














Review of Systems


ROS Statement: 


Those systems with pertinent positive or pertinent negative responses have been 

documented in the HPI.





ROS Other: All systems not noted in ROS Statement are negative.





Past Medical History


Past Medical History: Hyperlipidemia, Hypertension


Additional Past Medical History / Comment(s): PAST HX HYPOGLYCEMIA, NONE SINCE 

.  GOUT.  CURRENT RT INGUINAL HERNIA.


History of Any Multi-Drug Resistant Organisms: None Reported


Past Surgical History: Appendectomy, Joint Replacement


Additional Past Surgical History / Comment(s): COLONOSCOPY.  TOTAL LT KNEE , 

ROBOTIC HERNIA


Past Anesthesia/Blood Transfusion Reactions: No Reported Reaction


Additional Past Anesthesia/Blood Transfusion Reaction / Comment(s): ADOPTED -NO 

HISTORY


Past Psychological History: No Psychological Hx Reported


Smoking Status: Never smoker


Past Alcohol Use History: Occasional


Past Drug Use History: Marijuana





- Past Family History


  ** Mother


Family Medical History: Unable to Obtain


Additional Family Medical History / Comment(s): Pt is adopted and only knows 

heart disease ran on natural mother's side of family





  ** Father


Family Medical History: Diabetes Mellitus


Additional Family Medical History / Comment(s): Pt is adopted and only knows 

diabetes ran on natural father's side of family.





General Exam


Limitations: no limitations


General appearance: alert, in no apparent distress


Head exam: Present: atraumatic, normocephalic, normal inspection


Respiratory exam: Present: normal lung sounds bilaterally.  Absent: respiratory 

distress, wheezes, rales, rhonchi, stridor


Cardiovascular Exam: Present: regular rate, normal rhythm, normal heart sounds. 

 Absent: systolic murmur, diastolic murmur, rubs, gallop, clicks


Back exam: Present: normal inspection, full ROM, tenderness (Right sciatic 

notch)


Neurological exam: Present: alert, oriented X3, CN II-XII intact


Psychiatric exam: Present: normal affect, normal mood


Skin exam: Present: warm, dry, intact, normal color.  Absent: rash





Course


                                   Vital Signs











  22





  06:06 08:53


 


Temperature 98.2 F 


 


Pulse Rate 79 74


 


Respiratory 16 16





Rate  


 


Blood Pressure 191/110 152/94


 


O2 Sat by Pulse 98 98





Oximetry  














Medical Decision Making





- Medical Decision Making


This is a 62-year-old male who presents to the emergency department for right 

lower back pain with radicular symptoms down the right leg.  X-rays revealed 

mild narrowing of the lumbar spaces, however no acute findings were noted.  

Patient was given a dose of IM Decadron to help with possible swelling and 

inflammation around a nerve root.  Based on the dermatome distribution of his 

symptoms, this is most likely related to L4-L5. He is exhibiting no signs or 

symptoms of cauda equina syndrome.  Computed tomography scan of the lumbar spine

 was obtained for further evaluation of symptoms.  This revealed degenerative 

changes at multiple levels.  No evidence of a AAA was identified. Patient given 

follow up information for Dr. Meraz. Rx for Skelaxin and diclofenac were 

provided.  Instructed him to take the Skelaxin at night until he knows how to 

affects him, as it can be sedating.  He is also advised to avoid taking the 

diclofenac with any other anti-inflammatories.





Return precautions reviewed in depth, the patient is instructed to return to the

 emergency department with any new, worsening, or concerning symptoms. Patient 

verbalized understanding. 





This case was discussed in detail with the attending ED physician. Presentation,

 findings, and treatment plan discussed in detail as well. 








- Radiology Data


Radiology results: report reviewed, image reviewed





Disposition


Clinical Impression: 


 Lumbar radiculopathy





Disposition: HOME SELF-CARE


Instructions (If sedation given, give patient instructions):  Lumbar 

Radiculopathy (ED)


Additional Instructions: 


Return to the emergency department with any new, worsening, or concerning 

symptoms.  Do not take the diclofenac with any other anti-inflammatories.  Take 

either the diclofenac or Advil.  Take Tylenol with this.  Take the Skelaxin at 

night until you how it affects you, as it may be sedating.  Contact Dr. Meraz's 

office for a follow-up appointment.


Prescriptions: 


HYDROcodone/APAP 5-325MG [Norco 5-325] 1 tab PO Q6HR PRN 3 Days #12 tab


 PRN Reason: Pain


Metaxalone [Skelaxin] 800 mg PO TID PRN #25 tablet


 PRN Reason: Pain


Diclofenac Sodium [Voltaren] 75 mg PO BID PRN #25 tab


 PRN Reason: Pain


Is patient prescribed a controlled substance at d/c from ED?: Yes


When asked, does pt state using other controlled substances?: No


If prescribed controlled substance>3 days was MAPS reviewed?: Prescribed <3 Days


Referrals: 


MEHRDAD Pimentel III, MD [Primary Care Provider] - 1-2 days


ASTRID Meraz DO [Doctor of Osteopathic Medicine] - 1-2 days